# Patient Record
Sex: FEMALE | Race: WHITE | NOT HISPANIC OR LATINO | Employment: OTHER | URBAN - METROPOLITAN AREA
[De-identification: names, ages, dates, MRNs, and addresses within clinical notes are randomized per-mention and may not be internally consistent; named-entity substitution may affect disease eponyms.]

---

## 2020-01-07 ENCOUNTER — HOSPITAL ENCOUNTER (OUTPATIENT)
Dept: ONCOLOGY | Facility: HOSPITAL | Age: 75
Discharge: HOME | End: 2020-01-07
Attending: INTERNAL MEDICINE

## 2020-01-07 ENCOUNTER — OFFICE VISIT CONVERTED (OUTPATIENT)
Dept: ONCOLOGY | Facility: HOSPITAL | Age: 75
End: 2020-01-07
Attending: INTERNAL MEDICINE

## 2020-01-09 ENCOUNTER — HOSPITAL ENCOUNTER (OUTPATIENT)
Dept: PERIOP | Facility: HOSPITAL | Age: 75
Setting detail: HOSPITAL OUTPATIENT SURGERY
Discharge: HOME | End: 2020-01-09
Attending: SURGERY

## 2020-01-10 ENCOUNTER — HOSPITAL ENCOUNTER (OUTPATIENT)
Dept: MRI IMAGING | Facility: HOSPITAL | Age: 75
Discharge: HOME | End: 2020-01-10
Attending: INTERNAL MEDICINE

## 2020-01-13 ENCOUNTER — HOSPITAL ENCOUNTER (OUTPATIENT)
Dept: ONCOLOGY | Facility: HOSPITAL | Age: 75
Setting detail: RECURRING SERIES
Discharge: HOME | End: 2020-01-31
Attending: INTERNAL MEDICINE

## 2020-01-13 ENCOUNTER — HOSPITAL ENCOUNTER (OUTPATIENT)
Dept: PET IMAGING | Facility: HOSPITAL | Age: 75
Discharge: HOME | End: 2020-01-13
Attending: INTERNAL MEDICINE

## 2020-01-13 ENCOUNTER — OFFICE VISIT CONVERTED (OUTPATIENT)
Dept: ONCOLOGY | Facility: HOSPITAL | Age: 75
End: 2020-01-13
Attending: INTERNAL MEDICINE

## 2020-01-13 LAB
ALBUMIN SERPL-MCNC: 3.5 G/DL (ref 3.5–5)
ALBUMIN/GLOB SERPL: 1.2 {RATIO} (ref 1.4–2.6)
ALP SERPL-CCNC: 71 U/L (ref 43–160)
ALT SERPL-CCNC: 7 U/L (ref 10–40)
ANION GAP SERPL CALC-SCNC: 8 MMOL/L (ref 8–19)
AST SERPL-CCNC: 12 U/L (ref 15–50)
BASOPHILS # BLD AUTO: 0.02 10*3/UL (ref 0–0.2)
BASOPHILS NFR BLD AUTO: 0.1 % (ref 0–3)
BILIRUB SERPL-MCNC: 0.45 MG/DL (ref 0.2–1.3)
BUN SERPL-MCNC: 17 MG/DL (ref 5–25)
BUN/CREAT SERPL: 18 {RATIO} (ref 6–20)
CALCIUM SERPL-MCNC: 9.8 MG/DL (ref 8.7–10.4)
CALCIUM SPEC-SCNC: 9.4 MG/DL (ref 8.7–10.4)
CHLORIDE SERPL-SCNC: 98 MMOL/L (ref 99–111)
CONV ABS IMM GRAN: 0.27 10*3/UL (ref 0–0.54)
CONV CO2: 28 MMOL/L (ref 22–32)
CONV EOSINOPHILS PERCENT BY MANUAL COUNT: 0.1 % (ref 0–7)
CONV IMMATURE GRAN: 1.8 % (ref 0–0.4)
CONV TOTAL PROTEIN: 6.5 G/DL (ref 6.3–8.2)
CREAT UR-MCNC: 0.93 MG/DL (ref 0.5–0.9)
EOSINOPHIL # BLD MANUAL: 0.01 10*3/UL (ref 0–0.7)
ERYTHROCYTE [DISTWIDTH] IN BLOOD BY AUTOMATED COUNT: 13.5 % (ref 11.5–14.5)
ERYTHROCYTE [DISTWIDTH] IN BLOOD BY AUTOMATED COUNT: 43.7 FL
GFR SERPLBLD BASED ON 1.73 SQ M-ARVRAT: >60 ML/MIN/{1.73_M2}
GLOBULIN UR ELPH-MCNC: 3 G/DL (ref 2–3.5)
GLUCOSE SERPL-MCNC: 143 MG/DL (ref 65–99)
HBA1C MFR BLD: 11 G/DL (ref 12–16)
HCT VFR BLD AUTO: 33.5 % (ref 37–47)
LYMPHOCYTES # BLD AUTO: 1.53 10*3/UL (ref 1–5)
LYMPHOCYTES NFR BLD AUTO: 10.5 % (ref 20–45)
MCH RBC QN AUTO: 28.9 PG (ref 27–31)
MCHC RBC AUTO-ENTMCNC: 32.8 G/DL (ref 33–37)
MCV RBC AUTO: 87.9 FL (ref 81–99)
MONOCYTES # BLD AUTO: 0.48 10*3/UL (ref 0.2–1.2)
MONOCYTES NFR BLD MANUAL: 3.3 % (ref 3–10)
NEUTROPHILS # BLD AUTO: 12.3 10*3/UL (ref 2–8)
NEUTROPHILS NFR BLD MANUAL: 84.2 % (ref 30–85)
OSMOLALITY SERPL CALC.SUM OF ELEC: 274 MOSM/KG (ref 273–304)
PLATELET # BLD AUTO: 472 10*3/UL (ref 130–400)
PMV BLD AUTO: 8.8 FL (ref 7.4–10.4)
POTASSIUM SERPL-SCNC: 4.3 MMOL/L (ref 3.5–5.3)
RBC MORPH BLD: 3.81 10*6/UL (ref 4.2–5.4)
SODIUM SERPL-SCNC: 130 MMOL/L (ref 135–147)
T4 FREE SERPL-MCNC: 1.1 NG/DL (ref 0.9–1.8)
TSH SERPL-ACNC: 2.84 M[IU]/L (ref 0.27–4.2)
WBC # BLD AUTO: 14.61 10*3/UL (ref 4.8–10.8)

## 2020-01-27 ENCOUNTER — OFFICE VISIT CONVERTED (OUTPATIENT)
Dept: SURGERY | Facility: CLINIC | Age: 75
End: 2020-01-27
Attending: SURGERY

## 2020-02-04 ENCOUNTER — HOSPITAL ENCOUNTER (OUTPATIENT)
Dept: OTHER | Facility: HOSPITAL | Age: 75
Setting detail: RECURRING SERIES
Discharge: HOME | End: 2020-05-04
Attending: INTERNAL MEDICINE

## 2020-02-04 ENCOUNTER — OFFICE VISIT CONVERTED (OUTPATIENT)
Dept: ONCOLOGY | Facility: HOSPITAL | Age: 75
End: 2020-02-04
Attending: INTERNAL MEDICINE

## 2020-02-04 LAB
ALBUMIN SERPL-MCNC: 3 G/DL (ref 3.5–5)
ALBUMIN/GLOB SERPL: 1.1 {RATIO} (ref 1.4–2.6)
ALP SERPL-CCNC: 108 U/L (ref 43–160)
ALT SERPL-CCNC: 7 U/L (ref 10–40)
ANION GAP SERPL CALC-SCNC: 17 MMOL/L (ref 8–19)
AST SERPL-CCNC: 10 U/L (ref 15–50)
BASOPHILS # BLD AUTO: 0.05 10*3/UL (ref 0–0.2)
BASOPHILS NFR BLD AUTO: 0.3 % (ref 0–3)
BILIRUB SERPL-MCNC: 0.24 MG/DL (ref 0.2–1.3)
BUN SERPL-MCNC: 14 MG/DL (ref 5–25)
BUN/CREAT SERPL: 11 {RATIO} (ref 6–20)
CALCIUM SERPL-MCNC: 9.5 MG/DL (ref 8.7–10.4)
CALCIUM SPEC-SCNC: 8.7 MG/DL (ref 8.7–10.4)
CHLORIDE SERPL-SCNC: 105 MMOL/L (ref 99–111)
CONV ABS IMM GRAN: 1.46 10*3/UL (ref 0–0.54)
CONV CO2: 21 MMOL/L (ref 22–32)
CONV EOSINOPHILS PERCENT BY MANUAL COUNT: 0.1 % (ref 0–7)
CONV IMMATURE GRAN: 8 % (ref 0–0.4)
CONV TOTAL PROTEIN: 5.8 G/DL (ref 6.3–8.2)
CREAT UR-MCNC: 1.33 MG/DL (ref 0.5–0.9)
EOSINOPHIL # BLD MANUAL: 0.02 10*3/UL (ref 0–0.7)
ERYTHROCYTE [DISTWIDTH] IN BLOOD BY AUTOMATED COUNT: 15.3 % (ref 11.5–14.5)
ERYTHROCYTE [DISTWIDTH] IN BLOOD BY AUTOMATED COUNT: 48.7 FL
GFR SERPLBLD BASED ON 1.73 SQ M-ARVRAT: 39 ML/MIN/{1.73_M2}
GLOBULIN UR ELPH-MCNC: 2.8 G/DL (ref 2–3.5)
GLUCOSE SERPL-MCNC: 109 MG/DL (ref 65–99)
HBA1C MFR BLD: 9 G/DL (ref 12–16)
HCT VFR BLD AUTO: 27.9 % (ref 37–47)
LYMPHOCYTES # BLD AUTO: 3.46 10*3/UL (ref 1–5)
LYMPHOCYTES NFR BLD AUTO: 18.9 % (ref 20–45)
MCH RBC QN AUTO: 28.9 PG (ref 27–31)
MCHC RBC AUTO-ENTMCNC: 32.3 G/DL (ref 33–37)
MCV RBC AUTO: 89.7 FL (ref 81–99)
MONOCYTES # BLD AUTO: 1.58 10*3/UL (ref 0.2–1.2)
MONOCYTES NFR BLD MANUAL: 8.6 % (ref 3–10)
NEUTROPHILS # BLD AUTO: 11.77 10*3/UL (ref 2–8)
NEUTROPHILS NFR BLD MANUAL: 64.1 % (ref 30–85)
OSMOLALITY SERPL CALC.SUM OF ELEC: 289 MOSM/KG (ref 273–304)
PLATELET # BLD AUTO: 691 10*3/UL (ref 130–400)
PMV BLD AUTO: 8.5 FL (ref 7.4–10.4)
POTASSIUM SERPL-SCNC: 3.7 MMOL/L (ref 3.5–5.3)
RBC MORPH BLD: 3.11 10*6/UL (ref 4.2–5.4)
SODIUM SERPL-SCNC: 139 MMOL/L (ref 135–147)
T4 FREE SERPL-MCNC: 1.1 NG/DL (ref 0.9–1.8)
TSH SERPL-ACNC: 5.13 M[IU]/L (ref 0.27–4.2)
WBC # BLD AUTO: 18.34 10*3/UL (ref 4.8–10.8)

## 2020-02-06 LAB
ANION GAP SERPL CALC-SCNC: 10 MMOL/L (ref 8–19)
BUN SERPL-MCNC: 15 MG/DL (ref 5–25)
BUN/CREAT SERPL: 17 {RATIO} (ref 6–20)
CALCIUM SERPL-MCNC: 8.7 MG/DL (ref 8.7–10.4)
CALCIUM SPEC-SCNC: 8.7 MG/DL (ref 8.7–10.4)
CHLORIDE SERPL-SCNC: 110 MMOL/L (ref 99–111)
CONV CO2: 22 MMOL/L (ref 22–32)
CREAT UR-MCNC: 0.87 MG/DL (ref 0.5–0.9)
GFR SERPLBLD BASED ON 1.73 SQ M-ARVRAT: >60 ML/MIN/{1.73_M2}
GLUCOSE SERPL-MCNC: 106 MG/DL (ref 65–99)
OSMOLALITY SERPL CALC.SUM OF ELEC: 287 MOSM/KG (ref 273–304)
POTASSIUM SERPL-SCNC: 3.8 MMOL/L (ref 3.5–5.3)
SODIUM SERPL-SCNC: 138 MMOL/L (ref 135–147)

## 2020-02-25 ENCOUNTER — OFFICE VISIT CONVERTED (OUTPATIENT)
Dept: ONCOLOGY | Facility: HOSPITAL | Age: 75
End: 2020-02-25
Attending: INTERNAL MEDICINE

## 2020-02-25 LAB
ALBUMIN SERPL-MCNC: 2.9 G/DL (ref 3.5–5)
ALBUMIN/GLOB SERPL: 1 {RATIO} (ref 1.4–2.6)
ALP SERPL-CCNC: 140 U/L (ref 43–160)
ALT SERPL-CCNC: 7 U/L (ref 10–40)
ANION GAP SERPL CALC-SCNC: 13 MMOL/L (ref 8–19)
AST SERPL-CCNC: 12 U/L (ref 15–50)
BASOPHILS # BLD AUTO: 0.1 10*3/UL (ref 0–0.2)
BASOPHILS NFR BLD AUTO: 0.5 % (ref 0–3)
BILIRUB SERPL-MCNC: 0.28 MG/DL (ref 0.2–1.3)
BUN SERPL-MCNC: 14 MG/DL (ref 5–25)
BUN/CREAT SERPL: 13 {RATIO} (ref 6–20)
CALCIUM SERPL-MCNC: 8.2 MG/DL (ref 8.7–10.4)
CALCIUM SPEC-SCNC: 7.3 MG/DL (ref 8.7–10.4)
CHLORIDE SERPL-SCNC: 105 MMOL/L (ref 99–111)
CONV ABS IMM GRAN: 0.52 10*3/UL (ref 0–0.54)
CONV CO2: 23 MMOL/L (ref 22–32)
CONV EOSINOPHILS PERCENT BY MANUAL COUNT: 0.1 % (ref 0–7)
CONV IMMATURE GRAN: 2.8 % (ref 0–0.4)
CONV TOTAL PROTEIN: 5.7 G/DL (ref 6.3–8.2)
CREAT UR-MCNC: 1.08 MG/DL (ref 0.5–0.9)
EOSINOPHIL # BLD MANUAL: 0.01 10*3/UL (ref 0–0.7)
ERYTHROCYTE [DISTWIDTH] IN BLOOD BY AUTOMATED COUNT: 17 % (ref 11.5–14.5)
ERYTHROCYTE [DISTWIDTH] IN BLOOD BY AUTOMATED COUNT: 54.6 FL
FERRITIN SERPL-MCNC: 301 NG/ML (ref 10–200)
GFR SERPLBLD BASED ON 1.73 SQ M-ARVRAT: 51 ML/MIN/{1.73_M2}
GLOBULIN UR ELPH-MCNC: 2.8 G/DL (ref 2–3.5)
GLUCOSE SERPL-MCNC: 96 MG/DL (ref 65–99)
HBA1C MFR BLD: 9 G/DL (ref 12–16)
HCT VFR BLD AUTO: 27.8 % (ref 37–47)
IRON SATN MFR SERPL: 15 % (ref 20–55)
IRON SERPL-MCNC: 26 UG/DL (ref 60–170)
LYMPHOCYTES # BLD AUTO: 4.6 10*3/UL (ref 1–5)
LYMPHOCYTES NFR BLD AUTO: 24.7 % (ref 20–45)
MCH RBC QN AUTO: 28.8 PG (ref 27–31)
MCHC RBC AUTO-ENTMCNC: 32.4 G/DL (ref 33–37)
MCV RBC AUTO: 89.1 FL (ref 81–99)
MONOCYTES # BLD AUTO: 2.31 10*3/UL (ref 0.2–1.2)
MONOCYTES NFR BLD MANUAL: 12.4 % (ref 3–10)
NEUTROPHILS # BLD AUTO: 11.09 10*3/UL (ref 2–8)
NEUTROPHILS NFR BLD MANUAL: 59.5 % (ref 30–85)
OSMOLALITY SERPL CALC.SUM OF ELEC: 284 MOSM/KG (ref 273–304)
PLATELET # BLD AUTO: 759 10*3/UL (ref 130–400)
PMV BLD AUTO: 9.1 FL (ref 7.4–10.4)
POTASSIUM SERPL-SCNC: 3.5 MMOL/L (ref 3.5–5.3)
RBC MORPH BLD: 3.12 10*6/UL (ref 4.2–5.4)
SODIUM SERPL-SCNC: 137 MMOL/L (ref 135–147)
T4 FREE SERPL-MCNC: 1.2 NG/DL (ref 0.9–1.8)
TIBC SERPL-MCNC: 173 UG/DL (ref 245–450)
TRANSFERRIN SERPL-MCNC: 121 MG/DL (ref 250–380)
TSH SERPL-ACNC: 5.25 M[IU]/L (ref 0.27–4.2)
WBC # BLD AUTO: 18.63 10*3/UL (ref 4.8–10.8)

## 2020-03-17 ENCOUNTER — OFFICE VISIT CONVERTED (OUTPATIENT)
Dept: ONCOLOGY | Facility: HOSPITAL | Age: 75
End: 2020-03-17
Attending: INTERNAL MEDICINE

## 2020-03-17 LAB
ALBUMIN SERPL-MCNC: 3.5 G/DL (ref 3.5–5)
ALBUMIN/GLOB SERPL: 1.2 {RATIO} (ref 1.4–2.6)
ALP SERPL-CCNC: 129 U/L (ref 43–160)
ALT SERPL-CCNC: 8 U/L (ref 10–40)
ANION GAP SERPL CALC-SCNC: 14 MMOL/L (ref 8–19)
AST SERPL-CCNC: 14 U/L (ref 15–50)
BASOPHILS # BLD AUTO: 0.06 10*3/UL (ref 0–0.2)
BASOPHILS NFR BLD AUTO: 0.4 % (ref 0–3)
BILIRUB SERPL-MCNC: <0.15 MG/DL (ref 0.2–1.3)
BUN SERPL-MCNC: 9 MG/DL (ref 5–25)
BUN/CREAT SERPL: 9 {RATIO} (ref 6–20)
CALCIUM SERPL-MCNC: 9.5 MG/DL (ref 8.7–10.4)
CALCIUM SPEC-SCNC: 9.1 MG/DL (ref 8.7–10.4)
CHLORIDE SERPL-SCNC: 103 MMOL/L (ref 99–111)
CONV ABS IMM GRAN: 0.5 10*3/UL (ref 0–0.54)
CONV CO2: 24 MMOL/L (ref 22–32)
CONV EOSINOPHILS PERCENT BY MANUAL COUNT: 0.3 % (ref 0–7)
CONV IMMATURE GRAN: 3.1 % (ref 0–0.4)
CONV TOTAL PROTEIN: 6.5 G/DL (ref 6.3–8.2)
CREAT UR-MCNC: 0.98 MG/DL (ref 0.5–0.9)
EOSINOPHIL # BLD MANUAL: 0.05 10*3/UL (ref 0–0.7)
ERYTHROCYTE [DISTWIDTH] IN BLOOD BY AUTOMATED COUNT: 20.4 % (ref 11.5–14.5)
ERYTHROCYTE [DISTWIDTH] IN BLOOD BY AUTOMATED COUNT: 67.5 FL
GFR SERPLBLD BASED ON 1.73 SQ M-ARVRAT: 57 ML/MIN/{1.73_M2}
GLOBULIN UR ELPH-MCNC: 3 G/DL (ref 2–3.5)
GLUCOSE SERPL-MCNC: 105 MG/DL (ref 65–99)
HBA1C MFR BLD: 8.4 G/DL (ref 12–16)
HCT VFR BLD AUTO: 26.4 % (ref 37–47)
LYMPHOCYTES # BLD AUTO: 4.46 10*3/UL (ref 1–5)
LYMPHOCYTES NFR BLD AUTO: 28.1 % (ref 20–45)
MCH RBC QN AUTO: 29.8 PG (ref 27–31)
MCHC RBC AUTO-ENTMCNC: 31.8 G/DL (ref 33–37)
MCV RBC AUTO: 93.6 FL (ref 81–99)
MONOCYTES # BLD AUTO: 1.81 10*3/UL (ref 0.2–1.2)
MONOCYTES NFR BLD MANUAL: 11.4 % (ref 3–10)
NEUTROPHILS # BLD AUTO: 9 10*3/UL (ref 2–8)
NEUTROPHILS NFR BLD MANUAL: 56.7 % (ref 30–85)
OSMOLALITY SERPL CALC.SUM OF ELEC: 283 MOSM/KG (ref 273–304)
PLATELET # BLD AUTO: 768 10*3/UL (ref 130–400)
PMV BLD AUTO: 8.7 FL (ref 7.4–10.4)
POTASSIUM SERPL-SCNC: 4.1 MMOL/L (ref 3.5–5.3)
RBC MORPH BLD: 2.82 10*6/UL (ref 4.2–5.4)
SODIUM SERPL-SCNC: 137 MMOL/L (ref 135–147)
WBC # BLD AUTO: 15.88 10*3/UL (ref 4.8–10.8)

## 2020-03-23 ENCOUNTER — HOSPITAL ENCOUNTER (OUTPATIENT)
Dept: RADIATION ONCOLOGY | Facility: HOSPITAL | Age: 75
Setting detail: RECURRING SERIES
Discharge: STILL ADMITTED OR ADMINISTRATIVE | End: 2020-05-06
Attending: RADIOLOGY

## 2020-03-24 LAB
BASOPHILS # BLD AUTO: 0.04 10*3/UL (ref 0–0.2)
BASOPHILS NFR BLD AUTO: 0.2 % (ref 0–3)
CONV ABS IMM GRAN: 0.27 10*3/UL (ref 0–0.54)
CONV EOSINOPHILS PERCENT BY MANUAL COUNT: 0 % (ref 0–7)
CONV IMMATURE GRAN: 1.3 % (ref 0–0.4)
EOSINOPHIL # BLD MANUAL: 0.01 10*3/UL (ref 0–0.7)
ERYTHROCYTE [DISTWIDTH] IN BLOOD BY AUTOMATED COUNT: 18.5 % (ref 11.5–14.5)
ERYTHROCYTE [DISTWIDTH] IN BLOOD BY AUTOMATED COUNT: 63.3 FL
HBA1C MFR BLD: 7.5 G/DL (ref 12–16)
HCT VFR BLD AUTO: 23.5 % (ref 37–47)
LYMPHOCYTES # BLD AUTO: 2.06 10*3/UL (ref 1–5)
LYMPHOCYTES NFR BLD AUTO: 9.6 % (ref 20–45)
MCH RBC QN AUTO: 29.9 PG (ref 27–31)
MCHC RBC AUTO-ENTMCNC: 31.9 G/DL (ref 33–37)
MCV RBC AUTO: 93.6 FL (ref 81–99)
MONOCYTES # BLD AUTO: 0.32 10*3/UL (ref 0.2–1.2)
MONOCYTES NFR BLD MANUAL: 1.5 % (ref 3–10)
NEUTROPHILS # BLD AUTO: 18.83 10*3/UL (ref 2–8)
NEUTROPHILS NFR BLD MANUAL: 87.4 % (ref 30–85)
PATHOLOGY REVIEW: NORMAL
PLATELET # BLD AUTO: 362 10*3/UL (ref 130–400)
PMV BLD AUTO: 9 FL (ref 7.4–10.4)
RBC MORPH BLD: 2.51 10*6/UL (ref 4.2–5.4)
WBC # BLD AUTO: 21.53 10*3/UL (ref 4.8–10.8)

## 2020-03-31 LAB
BASOPHILS # BLD AUTO: 0.04 10*3/UL (ref 0–0.2)
BASOPHILS NFR BLD AUTO: 0.4 % (ref 0–3)
CONV ABS IMM GRAN: 0.3 10*3/UL (ref 0–0.54)
CONV EOSINOPHILS PERCENT BY MANUAL COUNT: 1.3 % (ref 0–7)
CONV IMMATURE GRAN: 2.6 % (ref 0–0.4)
EOSINOPHIL # BLD MANUAL: 0.15 10*3/UL (ref 0–0.7)
ERYTHROCYTE [DISTWIDTH] IN BLOOD BY AUTOMATED COUNT: 19.4 % (ref 11.5–14.5)
ERYTHROCYTE [DISTWIDTH] IN BLOOD BY AUTOMATED COUNT: 65.6 FL
HBA1C MFR BLD: 8.5 G/DL (ref 12–16)
HCT VFR BLD AUTO: 26.9 % (ref 37–47)
LYMPHOCYTES # BLD AUTO: 3.84 10*3/UL (ref 1–5)
LYMPHOCYTES NFR BLD AUTO: 33.8 % (ref 20–45)
MCH RBC QN AUTO: 30 PG (ref 27–31)
MCHC RBC AUTO-ENTMCNC: 31.6 G/DL (ref 33–37)
MCV RBC AUTO: 95.1 FL (ref 81–99)
MONOCYTES # BLD AUTO: 1.33 10*3/UL (ref 0.2–1.2)
MONOCYTES NFR BLD MANUAL: 11.7 % (ref 3–10)
NEUTROPHILS # BLD AUTO: 5.69 10*3/UL (ref 2–8)
NEUTROPHILS NFR BLD MANUAL: 50.2 % (ref 30–85)
PLATELET # BLD AUTO: 114 10*3/UL (ref 130–400)
PMV BLD AUTO: 9.9 FL (ref 7.4–10.4)
RBC MORPH BLD: 2.83 10*6/UL (ref 4.2–5.4)
WBC # BLD AUTO: 11.35 10*3/UL (ref 4.8–10.8)

## 2020-04-07 ENCOUNTER — OFFICE VISIT CONVERTED (OUTPATIENT)
Dept: ONCOLOGY | Facility: HOSPITAL | Age: 75
End: 2020-04-07
Attending: INTERNAL MEDICINE

## 2020-04-07 LAB
ALBUMIN SERPL-MCNC: 3.8 G/DL (ref 3.5–5)
ALBUMIN/GLOB SERPL: 1.4 {RATIO} (ref 1.4–2.6)
ALP SERPL-CCNC: 108 U/L (ref 43–160)
ALT SERPL-CCNC: 9 U/L (ref 10–40)
ANION GAP SERPL CALC-SCNC: 13 MMOL/L (ref 8–19)
AST SERPL-CCNC: 13 U/L (ref 15–50)
BASOPHILS # BLD AUTO: 0.03 10*3/UL (ref 0–0.2)
BASOPHILS NFR BLD AUTO: 0.3 % (ref 0–3)
BILIRUB SERPL-MCNC: 0.18 MG/DL (ref 0.2–1.3)
BUN SERPL-MCNC: 20 MG/DL (ref 5–25)
BUN/CREAT SERPL: 19 {RATIO} (ref 6–20)
CALCIUM SERPL-MCNC: 9.6 MG/DL (ref 8.7–10.4)
CALCIUM SPEC-SCNC: 9.4 MG/DL (ref 8.7–10.4)
CHLORIDE SERPL-SCNC: 103 MMOL/L (ref 99–111)
CONV ABS IMM GRAN: 0.11 10*3/UL (ref 0–0.54)
CONV CO2: 23 MMOL/L (ref 22–32)
CONV EOSINOPHILS PERCENT BY MANUAL COUNT: 0.7 % (ref 0–7)
CONV IMMATURE GRAN: 1.1 % (ref 0–0.4)
CONV TOTAL PROTEIN: 6.5 G/DL (ref 6.3–8.2)
CREAT UR-MCNC: 1.07 MG/DL (ref 0.5–0.9)
EOSINOPHIL # BLD MANUAL: 0.07 10*3/UL (ref 0–0.7)
ERYTHROCYTE [DISTWIDTH] IN BLOOD BY AUTOMATED COUNT: 18.7 % (ref 11.5–14.5)
ERYTHROCYTE [DISTWIDTH] IN BLOOD BY AUTOMATED COUNT: 64.8 FL
GFR SERPLBLD BASED ON 1.73 SQ M-ARVRAT: 51 ML/MIN/{1.73_M2}
GLOBULIN UR ELPH-MCNC: 2.7 G/DL (ref 2–3.5)
GLUCOSE SERPL-MCNC: 108 MG/DL (ref 65–99)
HBA1C MFR BLD: 9 G/DL (ref 12–16)
HCT VFR BLD AUTO: 28.2 % (ref 37–47)
LYMPHOCYTES # BLD AUTO: 1.84 10*3/UL (ref 1–5)
LYMPHOCYTES NFR BLD AUTO: 18.4 % (ref 20–45)
MCH RBC QN AUTO: 30.2 PG (ref 27–31)
MCHC RBC AUTO-ENTMCNC: 31.9 G/DL (ref 33–37)
MCV RBC AUTO: 94.6 FL (ref 81–99)
MONOCYTES # BLD AUTO: 0.99 10*3/UL (ref 0.2–1.2)
MONOCYTES NFR BLD MANUAL: 9.9 % (ref 3–10)
NEUTROPHILS # BLD AUTO: 6.98 10*3/UL (ref 2–8)
NEUTROPHILS NFR BLD MANUAL: 69.6 % (ref 30–85)
OSMOLALITY SERPL CALC.SUM OF ELEC: 283 MOSM/KG (ref 273–304)
PLATELET # BLD AUTO: 516 10*3/UL (ref 130–400)
PMV BLD AUTO: 8.3 FL (ref 7.4–10.4)
POTASSIUM SERPL-SCNC: 3.7 MMOL/L (ref 3.5–5.3)
RBC MORPH BLD: 2.98 10*6/UL (ref 4.2–5.4)
SODIUM SERPL-SCNC: 135 MMOL/L (ref 135–147)
T4 FREE SERPL-MCNC: 1 NG/DL (ref 0.9–1.8)
TSH SERPL-ACNC: 4.67 M[IU]/L (ref 0.27–4.2)
WBC # BLD AUTO: 10.02 10*3/UL (ref 4.8–10.8)

## 2020-04-28 ENCOUNTER — OFFICE VISIT CONVERTED (OUTPATIENT)
Dept: ONCOLOGY | Facility: HOSPITAL | Age: 75
End: 2020-04-28
Attending: INTERNAL MEDICINE

## 2020-04-28 LAB
ALBUMIN SERPL-MCNC: 3.9 G/DL (ref 3.5–5)
ALBUMIN/GLOB SERPL: 1.5 {RATIO} (ref 1.4–2.6)
ALP SERPL-CCNC: 80 U/L (ref 43–160)
ALT SERPL-CCNC: 7 U/L (ref 10–40)
ANION GAP SERPL CALC-SCNC: 14 MMOL/L (ref 8–19)
AST SERPL-CCNC: 13 U/L (ref 15–50)
BASOPHILS # BLD AUTO: 0.04 10*3/UL (ref 0–0.2)
BASOPHILS NFR BLD AUTO: 0.5 % (ref 0–3)
BILIRUB SERPL-MCNC: 0.19 MG/DL (ref 0.2–1.3)
BUN SERPL-MCNC: 22 MG/DL (ref 5–25)
BUN/CREAT SERPL: 20 {RATIO} (ref 6–20)
CALCIUM SERPL-MCNC: 9.6 MG/DL (ref 8.7–10.4)
CALCIUM SPEC-SCNC: 9.5 MG/DL (ref 8.7–10.4)
CHLORIDE SERPL-SCNC: 101 MMOL/L (ref 99–111)
CONV ABS IMM GRAN: 0 10*3/UL (ref 0–0.54)
CONV CO2: 24 MMOL/L (ref 22–32)
CONV EOSINOPHILS PERCENT BY MANUAL COUNT: 3.1 % (ref 0–7)
CONV IMMATURE GRAN: 0 % (ref 0–0.4)
CONV TOTAL PROTEIN: 6.5 G/DL (ref 6.3–8.2)
CREAT UR-MCNC: 1.1 MG/DL (ref 0.5–0.9)
EOSINOPHIL # BLD MANUAL: 0.23 10*3/UL (ref 0–0.7)
ERYTHROCYTE [DISTWIDTH] IN BLOOD BY AUTOMATED COUNT: 14.6 % (ref 11.5–14.5)
ERYTHROCYTE [DISTWIDTH] IN BLOOD BY AUTOMATED COUNT: 51.2 FL
GFR SERPLBLD BASED ON 1.73 SQ M-ARVRAT: 49 ML/MIN/{1.73_M2}
GLOBULIN UR ELPH-MCNC: 2.6 G/DL (ref 2–3.5)
GLUCOSE SERPL-MCNC: 104 MG/DL (ref 65–99)
HBA1C MFR BLD: 10.1 G/DL (ref 12–16)
HCT VFR BLD AUTO: 31.9 % (ref 37–47)
LYMPHOCYTES # BLD AUTO: 0.81 10*3/UL (ref 1–5)
LYMPHOCYTES NFR BLD AUTO: 10.9 % (ref 20–45)
MCH RBC QN AUTO: 30.3 PG (ref 27–31)
MCHC RBC AUTO-ENTMCNC: 31.7 G/DL (ref 33–37)
MCV RBC AUTO: 95.8 FL (ref 81–99)
MONOCYTES # BLD AUTO: 0.73 10*3/UL (ref 0.2–1.2)
MONOCYTES NFR BLD MANUAL: 9.8 % (ref 3–10)
NEUTROPHILS # BLD AUTO: 5.65 10*3/UL (ref 2–8)
NEUTROPHILS NFR BLD MANUAL: 75.7 % (ref 30–85)
OSMOLALITY SERPL CALC.SUM OF ELEC: 284 MOSM/KG (ref 273–304)
PLATELET # BLD AUTO: 280 10*3/UL (ref 130–400)
PMV BLD AUTO: 8.8 FL (ref 7.4–10.4)
POTASSIUM SERPL-SCNC: 3.7 MMOL/L (ref 3.5–5.3)
RBC MORPH BLD: 3.33 10*6/UL (ref 4.2–5.4)
SODIUM SERPL-SCNC: 135 MMOL/L (ref 135–147)
WBC # BLD AUTO: 7.46 10*3/UL (ref 4.8–10.8)

## 2020-05-19 ENCOUNTER — OFFICE VISIT CONVERTED (OUTPATIENT)
Dept: ONCOLOGY | Facility: HOSPITAL | Age: 75
End: 2020-05-19
Attending: INTERNAL MEDICINE

## 2020-05-19 ENCOUNTER — HOSPITAL ENCOUNTER (OUTPATIENT)
Dept: OTHER | Facility: HOSPITAL | Age: 75
Setting detail: RECURRING SERIES
Discharge: HOME | End: 2020-08-17
Attending: INTERNAL MEDICINE

## 2020-05-19 LAB
ALBUMIN SERPL-MCNC: 3.8 G/DL (ref 3.5–5)
ALBUMIN/GLOB SERPL: 1.3 {RATIO} (ref 1.4–2.6)
ALP SERPL-CCNC: 83 U/L (ref 43–160)
ALT SERPL-CCNC: 7 U/L (ref 10–40)
ANION GAP SERPL CALC-SCNC: 15 MMOL/L (ref 8–19)
AST SERPL-CCNC: 12 U/L (ref 15–50)
BASOPHILS # BLD AUTO: 0.03 10*3/UL (ref 0–0.2)
BASOPHILS NFR BLD AUTO: 0.5 % (ref 0–3)
BILIRUB SERPL-MCNC: 0.25 MG/DL (ref 0.2–1.3)
BUN SERPL-MCNC: 20 MG/DL (ref 5–25)
BUN/CREAT SERPL: 20 {RATIO} (ref 6–20)
CALCIUM SERPL-MCNC: 9.9 MG/DL (ref 8.7–10.4)
CALCIUM SPEC-SCNC: 9.7 MG/DL (ref 8.7–10.4)
CHLORIDE SERPL-SCNC: 99 MMOL/L (ref 99–111)
CONV ABS IMM GRAN: 0.01 10*3/UL (ref 0–0.54)
CONV CO2: 23 MMOL/L (ref 22–32)
CONV EOSINOPHILS PERCENT BY MANUAL COUNT: 2 % (ref 0–7)
CONV IMMATURE GRAN: 0.2 % (ref 0–0.4)
CONV TOTAL PROTEIN: 6.8 G/DL (ref 6.3–8.2)
CREAT UR-MCNC: 0.99 MG/DL (ref 0.5–0.9)
EOSINOPHIL # BLD MANUAL: 0.11 10*3/UL (ref 0–0.7)
ERYTHROCYTE [DISTWIDTH] IN BLOOD BY AUTOMATED COUNT: 12.8 % (ref 11.5–14.5)
ERYTHROCYTE [DISTWIDTH] IN BLOOD BY AUTOMATED COUNT: 43.1 FL
GFR SERPLBLD BASED ON 1.73 SQ M-ARVRAT: 56 ML/MIN/{1.73_M2}
GLOBULIN UR ELPH-MCNC: 3 G/DL (ref 2–3.5)
GLUCOSE SERPL-MCNC: 110 MG/DL (ref 65–99)
HBA1C MFR BLD: 11.9 G/DL (ref 12–16)
HCT VFR BLD AUTO: 36.9 % (ref 37–47)
LYMPHOCYTES # BLD AUTO: 1.04 10*3/UL (ref 1–5)
LYMPHOCYTES NFR BLD AUTO: 18.7 % (ref 20–45)
MCH RBC QN AUTO: 29.9 PG (ref 27–31)
MCHC RBC AUTO-ENTMCNC: 32.2 G/DL (ref 33–37)
MCV RBC AUTO: 92.7 FL (ref 81–99)
MONOCYTES # BLD AUTO: 0.5 10*3/UL (ref 0.2–1.2)
MONOCYTES NFR BLD MANUAL: 9 % (ref 3–10)
NEUTROPHILS # BLD AUTO: 3.87 10*3/UL (ref 2–8)
NEUTROPHILS NFR BLD MANUAL: 69.6 % (ref 30–85)
OSMOLALITY SERPL CALC.SUM OF ELEC: 279 MOSM/KG (ref 273–304)
PLATELET # BLD AUTO: 285 10*3/UL (ref 130–400)
PMV BLD AUTO: 8.8 FL (ref 7.4–10.4)
POTASSIUM SERPL-SCNC: 3.6 MMOL/L (ref 3.5–5.3)
RBC MORPH BLD: 3.98 10*6/UL (ref 4.2–5.4)
SODIUM SERPL-SCNC: 133 MMOL/L (ref 135–147)
T4 FREE SERPL-MCNC: 1.2 NG/DL (ref 0.9–1.8)
TSH SERPL-ACNC: 3.76 M[IU]/L (ref 0.27–4.2)
WBC # BLD AUTO: 5.56 10*3/UL (ref 4.8–10.8)

## 2020-06-09 ENCOUNTER — OFFICE VISIT CONVERTED (OUTPATIENT)
Dept: ONCOLOGY | Facility: HOSPITAL | Age: 75
End: 2020-06-09
Attending: INTERNAL MEDICINE

## 2020-06-09 LAB
ALBUMIN SERPL-MCNC: 3.8 G/DL (ref 3.5–5)
ALBUMIN/GLOB SERPL: 1.3 {RATIO} (ref 1.4–2.6)
ALP SERPL-CCNC: 77 U/L (ref 43–160)
ALT SERPL-CCNC: 6 U/L (ref 10–40)
ANION GAP SERPL CALC-SCNC: 14 MMOL/L (ref 8–19)
AST SERPL-CCNC: 13 U/L (ref 15–50)
BASOPHILS # BLD AUTO: 0.03 10*3/UL (ref 0–0.2)
BASOPHILS NFR BLD AUTO: 0.4 % (ref 0–3)
BILIRUB SERPL-MCNC: 0.22 MG/DL (ref 0.2–1.3)
BUN SERPL-MCNC: 15 MG/DL (ref 5–25)
BUN/CREAT SERPL: 14 {RATIO} (ref 6–20)
CALCIUM SERPL-MCNC: 10 MG/DL (ref 8.7–10.4)
CALCIUM SPEC-SCNC: 9.8 MG/DL (ref 8.7–10.4)
CHLORIDE SERPL-SCNC: 100 MMOL/L (ref 99–111)
CONV ABS IMM GRAN: 0.01 10*3/UL (ref 0–0.54)
CONV CO2: 24 MMOL/L (ref 22–32)
CONV EOSINOPHILS PERCENT BY MANUAL COUNT: 2.1 % (ref 0–7)
CONV IMMATURE GRAN: 0.1 % (ref 0–0.4)
CONV TOTAL PROTEIN: 6.7 G/DL (ref 6.3–8.2)
CREAT UR-MCNC: 1.11 MG/DL (ref 0.5–0.9)
EOSINOPHIL # BLD MANUAL: 0.14 10*3/UL (ref 0–0.7)
ERYTHROCYTE [DISTWIDTH] IN BLOOD BY AUTOMATED COUNT: 12.7 % (ref 11.5–14.5)
ERYTHROCYTE [DISTWIDTH] IN BLOOD BY AUTOMATED COUNT: 41.8 FL
GFR SERPLBLD BASED ON 1.73 SQ M-ARVRAT: 49 ML/MIN/{1.73_M2}
GLOBULIN UR ELPH-MCNC: 2.9 G/DL (ref 2–3.5)
GLUCOSE SERPL-MCNC: 102 MG/DL (ref 65–99)
HBA1C MFR BLD: 12.2 G/DL (ref 12–16)
HCT VFR BLD AUTO: 36.9 % (ref 37–47)
LYMPHOCYTES # BLD AUTO: 1.61 10*3/UL (ref 1–5)
LYMPHOCYTES NFR BLD AUTO: 23.7 % (ref 20–45)
MCH RBC QN AUTO: 29.6 PG (ref 27–31)
MCHC RBC AUTO-ENTMCNC: 33.1 G/DL (ref 33–37)
MCV RBC AUTO: 89.6 FL (ref 81–99)
MONOCYTES # BLD AUTO: 0.65 10*3/UL (ref 0.2–1.2)
MONOCYTES NFR BLD MANUAL: 9.6 % (ref 3–10)
NEUTROPHILS # BLD AUTO: 4.34 10*3/UL (ref 2–8)
NEUTROPHILS NFR BLD MANUAL: 64.1 % (ref 30–85)
OSMOLALITY SERPL CALC.SUM OF ELEC: 279 MOSM/KG (ref 273–304)
PLATELET # BLD AUTO: 302 10*3/UL (ref 130–400)
PMV BLD AUTO: 8.6 FL (ref 7.4–10.4)
POTASSIUM SERPL-SCNC: 3.7 MMOL/L (ref 3.5–5.3)
RBC MORPH BLD: 4.12 10*6/UL (ref 4.2–5.4)
SODIUM SERPL-SCNC: 134 MMOL/L (ref 135–147)
WBC # BLD AUTO: 6.78 10*3/UL (ref 4.8–10.8)

## 2020-06-22 ENCOUNTER — HOSPITAL ENCOUNTER (OUTPATIENT)
Dept: RADIATION ONCOLOGY | Facility: HOSPITAL | Age: 75
Discharge: HOME | End: 2020-06-22
Attending: RADIOLOGY

## 2020-06-25 ENCOUNTER — HOSPITAL ENCOUNTER (OUTPATIENT)
Dept: CT IMAGING | Facility: HOSPITAL | Age: 75
Discharge: HOME | End: 2020-06-25
Attending: INTERNAL MEDICINE

## 2020-06-30 ENCOUNTER — OFFICE VISIT CONVERTED (OUTPATIENT)
Dept: ONCOLOGY | Facility: HOSPITAL | Age: 75
End: 2020-06-30
Attending: INTERNAL MEDICINE

## 2020-06-30 LAB
ALBUMIN SERPL-MCNC: 3.7 G/DL (ref 3.5–5)
ALBUMIN/GLOB SERPL: 1.4 {RATIO} (ref 1.4–2.6)
ALP SERPL-CCNC: 64 U/L (ref 43–160)
ALT SERPL-CCNC: 6 U/L (ref 10–40)
ANION GAP SERPL CALC-SCNC: 13 MMOL/L (ref 8–19)
AST SERPL-CCNC: 12 U/L (ref 15–50)
BASOPHILS # BLD AUTO: 0.03 10*3/UL (ref 0–0.2)
BASOPHILS NFR BLD AUTO: 0.5 % (ref 0–3)
BILIRUB SERPL-MCNC: 0.19 MG/DL (ref 0.2–1.3)
BUN SERPL-MCNC: 12 MG/DL (ref 5–25)
BUN/CREAT SERPL: 13 {RATIO} (ref 6–20)
CALCIUM SERPL-MCNC: 9.7 MG/DL (ref 8.7–10.4)
CALCIUM SPEC-SCNC: 9.5 MG/DL (ref 8.7–10.4)
CHLORIDE SERPL-SCNC: 105 MMOL/L (ref 99–111)
CONV ABS IMM GRAN: 0.01 10*3/UL (ref 0–0.54)
CONV CO2: 23 MMOL/L (ref 22–32)
CONV EOSINOPHILS PERCENT BY MANUAL COUNT: 3.2 % (ref 0–7)
CONV IMMATURE GRAN: 0.2 % (ref 0–0.4)
CONV TOTAL PROTEIN: 6.4 G/DL (ref 6.3–8.2)
CREAT UR-MCNC: 0.96 MG/DL (ref 0.5–0.9)
EOSINOPHIL # BLD MANUAL: 0.21 10*3/UL (ref 0–0.7)
ERYTHROCYTE [DISTWIDTH] IN BLOOD BY AUTOMATED COUNT: 12.9 % (ref 11.5–14.5)
ERYTHROCYTE [DISTWIDTH] IN BLOOD BY AUTOMATED COUNT: 41.5 FL
GFR SERPLBLD BASED ON 1.73 SQ M-ARVRAT: 58 ML/MIN/{1.73_M2}
GLOBULIN UR ELPH-MCNC: 2.7 G/DL (ref 2–3.5)
GLUCOSE SERPL-MCNC: 112 MG/DL (ref 65–99)
HBA1C MFR BLD: 12 G/DL (ref 12–16)
HCT VFR BLD AUTO: 36.8 % (ref 37–47)
LYMPHOCYTES # BLD AUTO: 1.96 10*3/UL (ref 1–5)
LYMPHOCYTES NFR BLD AUTO: 29.9 % (ref 20–45)
MCH RBC QN AUTO: 28.6 PG (ref 27–31)
MCHC RBC AUTO-ENTMCNC: 32.6 G/DL (ref 33–37)
MCV RBC AUTO: 87.8 FL (ref 81–99)
MONOCYTES # BLD AUTO: 0.53 10*3/UL (ref 0.2–1.2)
MONOCYTES NFR BLD MANUAL: 8.1 % (ref 3–10)
NEUTROPHILS # BLD AUTO: 3.82 10*3/UL (ref 2–8)
NEUTROPHILS NFR BLD MANUAL: 58.1 % (ref 30–85)
OSMOLALITY SERPL CALC.SUM OF ELEC: 285 MOSM/KG (ref 273–304)
PLATELET # BLD AUTO: 254 10*3/UL (ref 130–400)
PMV BLD AUTO: 9 FL (ref 7.4–10.4)
POTASSIUM SERPL-SCNC: 3.7 MMOL/L (ref 3.5–5.3)
RBC MORPH BLD: 4.19 10*6/UL (ref 4.2–5.4)
SODIUM SERPL-SCNC: 137 MMOL/L (ref 135–147)
T4 FREE SERPL-MCNC: 1 NG/DL (ref 0.9–1.8)
TSH SERPL-ACNC: 4.69 M[IU]/L (ref 0.27–4.2)
WBC # BLD AUTO: 6.56 10*3/UL (ref 4.8–10.8)

## 2020-07-21 LAB
ALBUMIN SERPL-MCNC: 3.7 G/DL (ref 3.5–5)
ALBUMIN/GLOB SERPL: 1.4 {RATIO} (ref 1.4–2.6)
ALP SERPL-CCNC: 72 U/L (ref 43–160)
ALT SERPL-CCNC: 5 U/L (ref 10–40)
ANION GAP SERPL CALC-SCNC: 13 MMOL/L (ref 8–19)
AST SERPL-CCNC: 12 U/L (ref 15–50)
BASOPHILS # BLD AUTO: 0.03 10*3/UL (ref 0–0.2)
BASOPHILS NFR BLD AUTO: 0.4 % (ref 0–3)
BILIRUB SERPL-MCNC: 0.25 MG/DL (ref 0.2–1.3)
BUN SERPL-MCNC: 11 MG/DL (ref 5–25)
BUN/CREAT SERPL: 11 {RATIO} (ref 6–20)
CALCIUM SERPL-MCNC: 9.9 MG/DL (ref 8.7–10.4)
CHLORIDE SERPL-SCNC: 103 MMOL/L (ref 99–111)
CONV ABS IMM GRAN: 0.01 10*3/UL (ref 0–0.54)
CONV CO2: 24 MMOL/L (ref 22–32)
CONV EOSINOPHILS PERCENT BY MANUAL COUNT: 2.4 % (ref 0–7)
CONV IMMATURE GRAN: 0.1 % (ref 0–0.4)
CONV TOTAL PROTEIN: 6.4 G/DL (ref 6.3–8.2)
CREAT UR-MCNC: 1.01 MG/DL (ref 0.5–0.9)
EOSINOPHIL # BLD MANUAL: 0.17 10*3/UL (ref 0–0.7)
ERYTHROCYTE [DISTWIDTH] IN BLOOD BY AUTOMATED COUNT: 14.4 % (ref 11.5–14.5)
ERYTHROCYTE [DISTWIDTH] IN BLOOD BY AUTOMATED COUNT: 46.4 FL
GFR SERPLBLD BASED ON 1.73 SQ M-ARVRAT: 55 ML/MIN/{1.73_M2}
GLOBULIN UR ELPH-MCNC: 2.7 G/DL (ref 2–3.5)
GLUCOSE SERPL-MCNC: 98 MG/DL (ref 65–99)
HBA1C MFR BLD: 12.6 G/DL (ref 12–16)
HCT VFR BLD AUTO: 39.7 % (ref 37–47)
LYMPHOCYTES # BLD AUTO: 1.69 10*3/UL (ref 1–5)
LYMPHOCYTES NFR BLD AUTO: 23.7 % (ref 20–45)
MCH RBC QN AUTO: 28 PG (ref 27–31)
MCHC RBC AUTO-ENTMCNC: 31.7 G/DL (ref 33–37)
MCV RBC AUTO: 88.2 FL (ref 81–99)
MONOCYTES # BLD AUTO: 0.68 10*3/UL (ref 0.2–1.2)
MONOCYTES NFR BLD MANUAL: 9.5 % (ref 3–10)
NEUTROPHILS # BLD AUTO: 4.55 10*3/UL (ref 2–8)
NEUTROPHILS NFR BLD MANUAL: 63.9 % (ref 30–85)
OSMOLALITY SERPL CALC.SUM OF ELEC: 281 MOSM/KG (ref 273–304)
PLATELET # BLD AUTO: 294 10*3/UL (ref 130–400)
PMV BLD AUTO: 8.8 FL (ref 7.4–10.4)
POTASSIUM SERPL-SCNC: 3.9 MMOL/L (ref 3.5–5.3)
RBC MORPH BLD: 4.5 10*6/UL (ref 4.2–5.4)
SODIUM SERPL-SCNC: 136 MMOL/L (ref 135–147)
WBC # BLD AUTO: 7.13 10*3/UL (ref 4.8–10.8)

## 2020-08-04 ENCOUNTER — HOSPITAL ENCOUNTER (OUTPATIENT)
Dept: MRI IMAGING | Facility: HOSPITAL | Age: 75
Discharge: HOME | End: 2020-08-04
Attending: INTERNAL MEDICINE

## 2020-08-11 ENCOUNTER — OFFICE VISIT CONVERTED (OUTPATIENT)
Dept: ONCOLOGY | Facility: HOSPITAL | Age: 75
End: 2020-08-11
Attending: INTERNAL MEDICINE

## 2020-08-11 LAB
ALBUMIN SERPL-MCNC: 3.6 G/DL (ref 3.5–5)
ALBUMIN/GLOB SERPL: 1.4 {RATIO} (ref 1.4–2.6)
ALP SERPL-CCNC: 72 U/L (ref 43–160)
ALT SERPL-CCNC: 6 U/L (ref 10–40)
ANION GAP SERPL CALC-SCNC: 12 MMOL/L (ref 8–19)
AST SERPL-CCNC: 12 U/L (ref 15–50)
BASOPHILS # BLD AUTO: 0.03 10*3/UL (ref 0–0.2)
BASOPHILS NFR BLD AUTO: 0.5 % (ref 0–3)
BILIRUB SERPL-MCNC: 0.4 MG/DL (ref 0.2–1.3)
BUN SERPL-MCNC: 14 MG/DL (ref 5–25)
BUN/CREAT SERPL: 15 {RATIO} (ref 6–20)
CALCIUM SERPL-MCNC: 9.5 MG/DL (ref 8.7–10.4)
CHLORIDE SERPL-SCNC: 105 MMOL/L (ref 99–111)
CONV ABS IMM GRAN: 0.02 10*3/UL (ref 0–0.54)
CONV CO2: 24 MMOL/L (ref 22–32)
CONV EOSINOPHILS PERCENT BY MANUAL COUNT: 2.9 % (ref 0–7)
CONV IMMATURE GRAN: 0.3 % (ref 0–0.4)
CONV TOTAL PROTEIN: 6.1 G/DL (ref 6.3–8.2)
CREAT UR-MCNC: 0.96 MG/DL (ref 0.5–0.9)
EOSINOPHIL # BLD MANUAL: 0.19 10*3/UL (ref 0–0.7)
ERYTHROCYTE [DISTWIDTH] IN BLOOD BY AUTOMATED COUNT: 15.5 % (ref 11.5–14.5)
ERYTHROCYTE [DISTWIDTH] IN BLOOD BY AUTOMATED COUNT: 50.3 FL
GFR SERPLBLD BASED ON 1.73 SQ M-ARVRAT: 58 ML/MIN/{1.73_M2}
GLOBULIN UR ELPH-MCNC: 2.5 G/DL (ref 2–3.5)
GLUCOSE SERPL-MCNC: 90 MG/DL (ref 65–99)
HBA1C MFR BLD: 11.8 G/DL (ref 12–16)
HCT VFR BLD AUTO: 36.4 % (ref 37–47)
LYMPHOCYTES # BLD AUTO: 1.67 10*3/UL (ref 1–5)
LYMPHOCYTES NFR BLD AUTO: 25.8 % (ref 20–45)
MCH RBC QN AUTO: 28.3 PG (ref 27–31)
MCHC RBC AUTO-ENTMCNC: 32.4 G/DL (ref 33–37)
MCV RBC AUTO: 87.3 FL (ref 81–99)
MONOCYTES # BLD AUTO: 0.58 10*3/UL (ref 0.2–1.2)
MONOCYTES NFR BLD MANUAL: 9 % (ref 3–10)
NEUTROPHILS # BLD AUTO: 3.98 10*3/UL (ref 2–8)
NEUTROPHILS NFR BLD MANUAL: 61.5 % (ref 30–85)
OSMOLALITY SERPL CALC.SUM OF ELEC: 284 MOSM/KG (ref 273–304)
PLATELET # BLD AUTO: 293 10*3/UL (ref 130–400)
PMV BLD AUTO: 9 FL (ref 7.4–10.4)
POTASSIUM SERPL-SCNC: 3.9 MMOL/L (ref 3.5–5.3)
RBC MORPH BLD: 4.17 10*6/UL (ref 4.2–5.4)
SODIUM SERPL-SCNC: 137 MMOL/L (ref 135–147)
WBC # BLD AUTO: 6.47 10*3/UL (ref 4.8–10.8)

## 2020-09-01 ENCOUNTER — HOSPITAL ENCOUNTER (OUTPATIENT)
Dept: OTHER | Facility: HOSPITAL | Age: 75
Setting detail: RECURRING SERIES
Discharge: HOME | End: 2020-11-30
Attending: INTERNAL MEDICINE

## 2020-09-01 ENCOUNTER — OFFICE VISIT CONVERTED (OUTPATIENT)
Dept: ONCOLOGY | Facility: HOSPITAL | Age: 75
End: 2020-09-01
Attending: INTERNAL MEDICINE

## 2020-09-01 LAB
ALBUMIN SERPL-MCNC: 4 G/DL (ref 3.5–5)
ALBUMIN/GLOB SERPL: 1.5 {RATIO} (ref 1.4–2.6)
ALP SERPL-CCNC: 81 U/L (ref 43–160)
ALT SERPL-CCNC: 5 U/L (ref 10–40)
ANION GAP SERPL CALC-SCNC: 14 MMOL/L (ref 8–19)
AST SERPL-CCNC: 11 U/L (ref 15–50)
BASOPHILS # BLD AUTO: 0.02 10*3/UL (ref 0–0.2)
BASOPHILS NFR BLD AUTO: 0.3 % (ref 0–3)
BILIRUB SERPL-MCNC: 0.39 MG/DL (ref 0.2–1.3)
BUN SERPL-MCNC: 12 MG/DL (ref 5–25)
BUN/CREAT SERPL: 13 {RATIO} (ref 6–20)
CALCIUM SERPL-MCNC: 9.7 MG/DL (ref 8.7–10.4)
CHLORIDE SERPL-SCNC: 104 MMOL/L (ref 99–111)
CONV ABS IMM GRAN: 0.02 10*3/UL (ref 0–0.54)
CONV CO2: 23 MMOL/L (ref 22–32)
CONV EOSINOPHILS PERCENT BY MANUAL COUNT: 2.3 % (ref 0–7)
CONV IMMATURE GRAN: 0.3 % (ref 0–0.4)
CONV TOTAL PROTEIN: 6.7 G/DL (ref 6.3–8.2)
CREAT UR-MCNC: 0.93 MG/DL (ref 0.5–0.9)
EOSINOPHIL # BLD MANUAL: 0.17 10*3/UL (ref 0–0.7)
ERYTHROCYTE [DISTWIDTH] IN BLOOD BY AUTOMATED COUNT: 16.3 % (ref 11.5–14.5)
ERYTHROCYTE [DISTWIDTH] IN BLOOD BY AUTOMATED COUNT: 54 FL
GFR SERPLBLD BASED ON 1.73 SQ M-ARVRAT: >60 ML/MIN/{1.73_M2}
GLOBULIN UR ELPH-MCNC: 2.7 G/DL (ref 2–3.5)
GLUCOSE SERPL-MCNC: 101 MG/DL (ref 65–99)
HBA1C MFR BLD: 12.4 G/DL (ref 12–16)
HCT VFR BLD AUTO: 38 % (ref 37–47)
LYMPHOCYTES # BLD AUTO: 2.21 10*3/UL (ref 1–5)
LYMPHOCYTES NFR BLD AUTO: 30.1 % (ref 20–45)
MCH RBC QN AUTO: 29.2 PG (ref 27–31)
MCHC RBC AUTO-ENTMCNC: 32.6 G/DL (ref 33–37)
MCV RBC AUTO: 89.6 FL (ref 81–99)
MONOCYTES # BLD AUTO: 0.66 10*3/UL (ref 0.2–1.2)
MONOCYTES NFR BLD MANUAL: 9 % (ref 3–10)
NEUTROPHILS # BLD AUTO: 4.27 10*3/UL (ref 2–8)
NEUTROPHILS NFR BLD MANUAL: 58 % (ref 30–85)
OSMOLALITY SERPL CALC.SUM OF ELEC: 284 MOSM/KG (ref 273–304)
PLATELET # BLD AUTO: 344 10*3/UL (ref 130–400)
PMV BLD AUTO: 8.8 FL (ref 7.4–10.4)
POTASSIUM SERPL-SCNC: 3.7 MMOL/L (ref 3.5–5.3)
RBC MORPH BLD: 4.24 10*6/UL (ref 4.2–5.4)
SODIUM SERPL-SCNC: 137 MMOL/L (ref 135–147)
T4 FREE SERPL-MCNC: 1.1 NG/DL (ref 0.9–1.8)
TSH SERPL-ACNC: 2.47 M[IU]/L (ref 0.27–4.2)
WBC # BLD AUTO: 7.35 10*3/UL (ref 4.8–10.8)

## 2020-09-22 ENCOUNTER — OFFICE VISIT CONVERTED (OUTPATIENT)
Dept: ONCOLOGY | Facility: HOSPITAL | Age: 75
End: 2020-09-22
Attending: INTERNAL MEDICINE

## 2020-09-22 LAB
ALBUMIN SERPL-MCNC: 3.8 G/DL (ref 3.5–5)
ALBUMIN/GLOB SERPL: 1.5 {RATIO} (ref 1.4–2.6)
ALP SERPL-CCNC: 84 U/L (ref 43–160)
ALT SERPL-CCNC: 5 U/L (ref 10–40)
ANION GAP SERPL CALC-SCNC: 12 MMOL/L (ref 8–19)
AST SERPL-CCNC: 11 U/L (ref 15–50)
BASOPHILS # BLD AUTO: 0.03 10*3/UL (ref 0–0.2)
BASOPHILS NFR BLD AUTO: 0.4 % (ref 0–3)
BILIRUB SERPL-MCNC: 0.21 MG/DL (ref 0.2–1.3)
BUN SERPL-MCNC: 13 MG/DL (ref 5–25)
BUN/CREAT SERPL: 14 {RATIO} (ref 6–20)
CALCIUM SERPL-MCNC: 9.6 MG/DL (ref 8.7–10.4)
CHLORIDE SERPL-SCNC: 105 MMOL/L (ref 99–111)
CONV ABS IMM GRAN: 0.01 10*3/UL (ref 0–0.54)
CONV CO2: 24 MMOL/L (ref 22–32)
CONV EOSINOPHILS PERCENT BY MANUAL COUNT: 2.8 % (ref 0–7)
CONV IMMATURE GRAN: 0.1 % (ref 0–0.4)
CONV TOTAL PROTEIN: 6.4 G/DL (ref 6.3–8.2)
CREAT UR-MCNC: 0.91 MG/DL (ref 0.5–0.9)
EOSINOPHIL # BLD MANUAL: 0.19 10*3/UL (ref 0–0.7)
ERYTHROCYTE [DISTWIDTH] IN BLOOD BY AUTOMATED COUNT: 14.5 % (ref 11.5–14.5)
ERYTHROCYTE [DISTWIDTH] IN BLOOD BY AUTOMATED COUNT: 49.2 FL
GFR SERPLBLD BASED ON 1.73 SQ M-ARVRAT: >60 ML/MIN/{1.73_M2}
GLOBULIN UR ELPH-MCNC: 2.6 G/DL (ref 2–3.5)
GLUCOSE SERPL-MCNC: 105 MG/DL (ref 65–99)
HBA1C MFR BLD: 12.8 G/DL (ref 12–16)
HCT VFR BLD AUTO: 39 % (ref 37–47)
LYMPHOCYTES # BLD AUTO: 1.86 10*3/UL (ref 1–5)
LYMPHOCYTES NFR BLD AUTO: 27 % (ref 20–45)
MCH RBC QN AUTO: 29.6 PG (ref 27–31)
MCHC RBC AUTO-ENTMCNC: 32.8 G/DL (ref 33–37)
MCV RBC AUTO: 90.3 FL (ref 81–99)
MONOCYTES # BLD AUTO: 0.64 10*3/UL (ref 0.2–1.2)
MONOCYTES NFR BLD MANUAL: 9.3 % (ref 3–10)
NEUTROPHILS # BLD AUTO: 4.17 10*3/UL (ref 2–8)
NEUTROPHILS NFR BLD MANUAL: 60.4 % (ref 30–85)
OSMOLALITY SERPL CALC.SUM OF ELEC: 284 MOSM/KG (ref 273–304)
PLATELET # BLD AUTO: 299 10*3/UL (ref 130–400)
PMV BLD AUTO: 8.9 FL (ref 7.4–10.4)
POTASSIUM SERPL-SCNC: 3.9 MMOL/L (ref 3.5–5.3)
RBC MORPH BLD: 4.32 10*6/UL (ref 4.2–5.4)
SODIUM SERPL-SCNC: 137 MMOL/L (ref 135–147)
WBC # BLD AUTO: 6.9 10*3/UL (ref 4.8–10.8)

## 2020-10-13 LAB
ALBUMIN SERPL-MCNC: 3.7 G/DL (ref 3.5–5)
ALBUMIN/GLOB SERPL: 1.4 {RATIO} (ref 1.4–2.6)
ALP SERPL-CCNC: 90 U/L (ref 43–160)
ALT SERPL-CCNC: 5 U/L (ref 10–40)
ANION GAP SERPL CALC-SCNC: 12 MMOL/L (ref 8–19)
AST SERPL-CCNC: 10 U/L (ref 15–50)
BASOPHILS # BLD AUTO: 0.04 10*3/UL (ref 0–0.2)
BASOPHILS NFR BLD AUTO: 0.5 % (ref 0–3)
BILIRUB SERPL-MCNC: 0.23 MG/DL (ref 0.2–1.3)
BUN SERPL-MCNC: 11 MG/DL (ref 5–25)
BUN/CREAT SERPL: 12 {RATIO} (ref 6–20)
CALCIUM SERPL-MCNC: 9.7 MG/DL (ref 8.7–10.4)
CHLORIDE SERPL-SCNC: 111 MMOL/L (ref 99–111)
CONV ABS IMM GRAN: 0.01 10*3/UL (ref 0–0.54)
CONV CO2: 24 MMOL/L (ref 22–32)
CONV EOSINOPHILS PERCENT BY MANUAL COUNT: 2.5 % (ref 0–7)
CONV IMMATURE GRAN: 0.1 % (ref 0–0.4)
CONV TOTAL PROTEIN: 6.4 G/DL (ref 6.3–8.2)
CREAT UR-MCNC: 0.91 MG/DL (ref 0.5–0.9)
EOSINOPHIL # BLD MANUAL: 0.21 10*3/UL (ref 0–0.7)
ERYTHROCYTE [DISTWIDTH] IN BLOOD BY AUTOMATED COUNT: 13.6 % (ref 11.5–14.5)
ERYTHROCYTE [DISTWIDTH] IN BLOOD BY AUTOMATED COUNT: 45.8 FL
GFR SERPLBLD BASED ON 1.73 SQ M-ARVRAT: >60 ML/MIN/{1.73_M2}
GLOBULIN UR ELPH-MCNC: 2.7 G/DL (ref 2–3.5)
GLUCOSE SERPL-MCNC: 90 MG/DL (ref 65–99)
HBA1C MFR BLD: 13.2 G/DL (ref 12–16)
HCT VFR BLD AUTO: 40.3 % (ref 37–47)
LYMPHOCYTES # BLD AUTO: 1.99 10*3/UL (ref 1–5)
LYMPHOCYTES NFR BLD AUTO: 24 % (ref 20–45)
MCH RBC QN AUTO: 29.6 PG (ref 27–31)
MCHC RBC AUTO-ENTMCNC: 32.8 G/DL (ref 33–37)
MCV RBC AUTO: 90.4 FL (ref 81–99)
MONOCYTES # BLD AUTO: 0.61 10*3/UL (ref 0.2–1.2)
MONOCYTES NFR BLD MANUAL: 7.3 % (ref 3–10)
NEUTROPHILS # BLD AUTO: 5.44 10*3/UL (ref 2–8)
NEUTROPHILS NFR BLD MANUAL: 65.6 % (ref 30–85)
OSMOLALITY SERPL CALC.SUM OF ELEC: 295 MOSM/KG (ref 273–304)
PLATELET # BLD AUTO: 325 10*3/UL (ref 130–400)
PMV BLD AUTO: 9 FL (ref 7.4–10.4)
POTASSIUM SERPL-SCNC: 4.1 MMOL/L (ref 3.5–5.3)
RBC MORPH BLD: 4.46 10*6/UL (ref 4.2–5.4)
SODIUM SERPL-SCNC: 143 MMOL/L (ref 135–147)
T4 FREE SERPL-MCNC: 0.9 NG/DL (ref 0.9–1.8)
TSH SERPL-ACNC: 3.72 M[IU]/L (ref 0.27–4.2)
WBC # BLD AUTO: 8.3 10*3/UL (ref 4.8–10.8)

## 2020-10-19 ENCOUNTER — HOSPITAL ENCOUNTER (OUTPATIENT)
Dept: RADIATION ONCOLOGY | Facility: HOSPITAL | Age: 75
Discharge: HOME | End: 2020-10-19
Attending: RADIOLOGY

## 2020-11-03 ENCOUNTER — OFFICE VISIT CONVERTED (OUTPATIENT)
Dept: ONCOLOGY | Facility: HOSPITAL | Age: 75
End: 2020-11-03
Attending: INTERNAL MEDICINE

## 2020-11-03 LAB
ALBUMIN SERPL-MCNC: 3.8 G/DL (ref 3.5–5)
ALBUMIN/GLOB SERPL: 1.4 {RATIO} (ref 1.4–2.6)
ALP SERPL-CCNC: 101 U/L (ref 43–160)
ALT SERPL-CCNC: 5 U/L (ref 10–40)
ANION GAP SERPL CALC-SCNC: 13 MMOL/L (ref 8–19)
AST SERPL-CCNC: 10 U/L (ref 15–50)
BASOPHILS # BLD AUTO: 0.05 10*3/UL (ref 0–0.2)
BASOPHILS NFR BLD AUTO: 0.7 % (ref 0–3)
BILIRUB SERPL-MCNC: 0.29 MG/DL (ref 0.2–1.3)
BUN SERPL-MCNC: 13 MG/DL (ref 5–25)
BUN/CREAT SERPL: 13 {RATIO} (ref 6–20)
CALCIUM SERPL-MCNC: 9.8 MG/DL (ref 8.7–10.4)
CHLORIDE SERPL-SCNC: 102 MMOL/L (ref 99–111)
CONV ABS IMM GRAN: 0.01 10*3/UL (ref 0–0.54)
CONV CO2: 24 MMOL/L (ref 22–32)
CONV EOSINOPHILS PERCENT BY MANUAL COUNT: 2.3 % (ref 0–7)
CONV IMMATURE GRAN: 0.1 % (ref 0–0.4)
CONV TOTAL PROTEIN: 6.5 G/DL (ref 6.3–8.2)
CREAT UR-MCNC: 0.99 MG/DL (ref 0.5–0.9)
EOSINOPHIL # BLD MANUAL: 0.17 10*3/UL (ref 0–0.7)
ERYTHROCYTE [DISTWIDTH] IN BLOOD BY AUTOMATED COUNT: 13.5 % (ref 11.5–14.5)
ERYTHROCYTE [DISTWIDTH] IN BLOOD BY AUTOMATED COUNT: 44.1 FL
GFR SERPLBLD BASED ON 1.73 SQ M-ARVRAT: 56 ML/MIN/{1.73_M2}
GLOBULIN UR ELPH-MCNC: 2.7 G/DL (ref 2–3.5)
GLUCOSE SERPL-MCNC: 97 MG/DL (ref 65–99)
HBA1C MFR BLD: 13 G/DL (ref 12–16)
HCT VFR BLD AUTO: 38.4 % (ref 37–47)
LYMPHOCYTES # BLD AUTO: 2.23 10*3/UL (ref 1–5)
LYMPHOCYTES NFR BLD AUTO: 29.9 % (ref 20–45)
MCH RBC QN AUTO: 30.2 PG (ref 27–31)
MCHC RBC AUTO-ENTMCNC: 33.9 G/DL (ref 33–37)
MCV RBC AUTO: 89.1 FL (ref 81–99)
MONOCYTES # BLD AUTO: 0.59 10*3/UL (ref 0.2–1.2)
MONOCYTES NFR BLD MANUAL: 7.9 % (ref 3–10)
NEUTROPHILS # BLD AUTO: 4.4 10*3/UL (ref 2–8)
NEUTROPHILS NFR BLD MANUAL: 59.1 % (ref 30–85)
OSMOLALITY SERPL CALC.SUM OF ELEC: 280 MOSM/KG (ref 273–304)
PLATELET # BLD AUTO: 290 10*3/UL (ref 130–400)
PMV BLD AUTO: 9.2 FL (ref 7.4–10.4)
POTASSIUM SERPL-SCNC: 3.5 MMOL/L (ref 3.5–5.3)
RBC MORPH BLD: 4.31 10*6/UL (ref 4.2–5.4)
SODIUM SERPL-SCNC: 135 MMOL/L (ref 135–147)
WBC # BLD AUTO: 7.45 10*3/UL (ref 4.8–10.8)

## 2020-11-16 ENCOUNTER — HOSPITAL ENCOUNTER (OUTPATIENT)
Dept: MRI IMAGING | Facility: HOSPITAL | Age: 75
Discharge: HOME | End: 2020-11-16
Attending: INTERNAL MEDICINE

## 2020-11-24 ENCOUNTER — OFFICE VISIT CONVERTED (OUTPATIENT)
Dept: ONCOLOGY | Facility: HOSPITAL | Age: 75
End: 2020-11-24
Attending: INTERNAL MEDICINE

## 2020-11-24 LAB
ALBUMIN SERPL-MCNC: 3.8 G/DL (ref 3.5–5)
ALBUMIN/GLOB SERPL: 1.4 {RATIO} (ref 1.4–2.6)
ALP SERPL-CCNC: 100 U/L (ref 43–160)
ALT SERPL-CCNC: 6 U/L (ref 10–40)
ANION GAP SERPL CALC-SCNC: 11 MMOL/L (ref 8–19)
AST SERPL-CCNC: 12 U/L (ref 15–50)
BASOPHILS # BLD AUTO: 0.03 10*3/UL (ref 0–0.2)
BASOPHILS NFR BLD AUTO: 0.4 % (ref 0–3)
BILIRUB SERPL-MCNC: 0.3 MG/DL (ref 0.2–1.3)
BUN SERPL-MCNC: 11 MG/DL (ref 5–25)
BUN/CREAT SERPL: 10 {RATIO} (ref 6–20)
CALCIUM SERPL-MCNC: 9.9 MG/DL (ref 8.7–10.4)
CHLORIDE SERPL-SCNC: 105 MMOL/L (ref 99–111)
CONV ABS IMM GRAN: 0.01 10*3/UL (ref 0–0.54)
CONV CO2: 25 MMOL/L (ref 22–32)
CONV EOSINOPHILS PERCENT BY MANUAL COUNT: 2 % (ref 0–7)
CONV IMMATURE GRAN: 0.1 % (ref 0–0.4)
CONV TOTAL PROTEIN: 6.5 G/DL (ref 6.3–8.2)
CREAT UR-MCNC: 1.06 MG/DL (ref 0.5–0.9)
EOSINOPHIL # BLD MANUAL: 0.15 10*3/UL (ref 0–0.7)
ERYTHROCYTE [DISTWIDTH] IN BLOOD BY AUTOMATED COUNT: 13.8 % (ref 11.5–14.5)
ERYTHROCYTE [DISTWIDTH] IN BLOOD BY AUTOMATED COUNT: 45.3 FL
GFR SERPLBLD BASED ON 1.73 SQ M-ARVRAT: 51 ML/MIN/{1.73_M2}
GLOBULIN UR ELPH-MCNC: 2.7 G/DL (ref 2–3.5)
GLUCOSE SERPL-MCNC: 118 MG/DL (ref 65–99)
HBA1C MFR BLD: 12.6 G/DL (ref 12–16)
HCT VFR BLD AUTO: 38.4 % (ref 37–47)
LYMPHOCYTES # BLD AUTO: 2.19 10*3/UL (ref 1–5)
LYMPHOCYTES NFR BLD AUTO: 28.5 % (ref 20–45)
MCH RBC QN AUTO: 29.4 PG (ref 27–31)
MCHC RBC AUTO-ENTMCNC: 32.8 G/DL (ref 33–37)
MCV RBC AUTO: 89.7 FL (ref 81–99)
MONOCYTES # BLD AUTO: 0.6 10*3/UL (ref 0.2–1.2)
MONOCYTES NFR BLD MANUAL: 7.8 % (ref 3–10)
NEUTROPHILS # BLD AUTO: 4.71 10*3/UL (ref 2–8)
NEUTROPHILS NFR BLD MANUAL: 61.2 % (ref 30–85)
OSMOLALITY SERPL CALC.SUM OF ELEC: 285 MOSM/KG (ref 273–304)
PLATELET # BLD AUTO: 297 10*3/UL (ref 130–400)
PMV BLD AUTO: 9 FL (ref 7.4–10.4)
POTASSIUM SERPL-SCNC: 3.7 MMOL/L (ref 3.5–5.3)
RBC MORPH BLD: 4.28 10*6/UL (ref 4.2–5.4)
SODIUM SERPL-SCNC: 137 MMOL/L (ref 135–147)
T4 FREE SERPL-MCNC: 1 NG/DL (ref 0.9–1.8)
TSH SERPL-ACNC: 4.44 M[IU]/L (ref 0.27–4.2)
WBC # BLD AUTO: 7.69 10*3/UL (ref 4.8–10.8)

## 2020-12-07 ENCOUNTER — HOSPITAL ENCOUNTER (OUTPATIENT)
Dept: PET IMAGING | Facility: HOSPITAL | Age: 75
Discharge: HOME | End: 2020-12-07
Attending: INTERNAL MEDICINE

## 2020-12-15 ENCOUNTER — OFFICE VISIT CONVERTED (OUTPATIENT)
Dept: ONCOLOGY | Facility: HOSPITAL | Age: 75
End: 2020-12-15
Attending: INTERNAL MEDICINE

## 2020-12-15 ENCOUNTER — HOSPITAL ENCOUNTER (OUTPATIENT)
Dept: OTHER | Facility: HOSPITAL | Age: 75
Setting detail: RECURRING SERIES
Discharge: HOME | End: 2021-03-15
Attending: INTERNAL MEDICINE

## 2020-12-15 LAB
ALBUMIN SERPL-MCNC: 3.9 G/DL (ref 3.5–5)
ALBUMIN/GLOB SERPL: 1.4 {RATIO} (ref 1.4–2.6)
ALP SERPL-CCNC: 102 U/L (ref 43–160)
ALT SERPL-CCNC: 7 U/L (ref 10–40)
ANION GAP SERPL CALC-SCNC: 11 MMOL/L (ref 8–19)
AST SERPL-CCNC: 12 U/L (ref 15–50)
BASOPHILS # BLD AUTO: 0.04 10*3/UL (ref 0–0.2)
BASOPHILS NFR BLD AUTO: 0.5 % (ref 0–3)
BILIRUB SERPL-MCNC: 0.38 MG/DL (ref 0.2–1.3)
BUN SERPL-MCNC: 13 MG/DL (ref 5–25)
BUN/CREAT SERPL: 12 {RATIO} (ref 6–20)
CALCIUM SERPL-MCNC: 9.5 MG/DL (ref 8.7–10.4)
CHLORIDE SERPL-SCNC: 101 MMOL/L (ref 99–111)
CONV ABS IMM GRAN: 0.01 10*3/UL (ref 0–0.54)
CONV CO2: 25 MMOL/L (ref 22–32)
CONV EOSINOPHILS PERCENT BY MANUAL COUNT: 1.5 % (ref 0–7)
CONV IMMATURE GRAN: 0.1 % (ref 0–0.4)
CONV TOTAL PROTEIN: 6.7 G/DL (ref 6.3–8.2)
CREAT UR-MCNC: 1.13 MG/DL (ref 0.5–0.9)
EOSINOPHIL # BLD MANUAL: 0.11 10*3/UL (ref 0–0.7)
ERYTHROCYTE [DISTWIDTH] IN BLOOD BY AUTOMATED COUNT: 14.1 % (ref 11.5–14.5)
ERYTHROCYTE [DISTWIDTH] IN BLOOD BY AUTOMATED COUNT: 46.2 FL
GFR SERPLBLD BASED ON 1.73 SQ M-ARVRAT: 48 ML/MIN/{1.73_M2}
GLOBULIN UR ELPH-MCNC: 2.8 G/DL (ref 2–3.5)
GLUCOSE SERPL-MCNC: 121 MG/DL (ref 65–99)
HBA1C MFR BLD: 12.7 G/DL (ref 12–16)
HCT VFR BLD AUTO: 39.3 % (ref 37–47)
LYMPHOCYTES # BLD AUTO: 1.97 10*3/UL (ref 1–5)
LYMPHOCYTES NFR BLD AUTO: 26.5 % (ref 20–45)
MCH RBC QN AUTO: 28.7 PG (ref 27–31)
MCHC RBC AUTO-ENTMCNC: 32.3 G/DL (ref 33–37)
MCV RBC AUTO: 88.9 FL (ref 81–99)
MONOCYTES # BLD AUTO: 0.62 10*3/UL (ref 0.2–1.2)
MONOCYTES NFR BLD MANUAL: 8.3 % (ref 3–10)
NEUTROPHILS # BLD AUTO: 4.68 10*3/UL (ref 2–8)
NEUTROPHILS NFR BLD MANUAL: 63.1 % (ref 30–85)
OSMOLALITY SERPL CALC.SUM OF ELEC: 277 MOSM/KG (ref 273–304)
PLATELET # BLD AUTO: 291 10*3/UL (ref 130–400)
PMV BLD AUTO: 8.9 FL (ref 7.4–10.4)
POTASSIUM SERPL-SCNC: 3.9 MMOL/L (ref 3.5–5.3)
RBC MORPH BLD: 4.42 10*6/UL (ref 4.2–5.4)
SODIUM SERPL-SCNC: 133 MMOL/L (ref 135–147)
WBC # BLD AUTO: 7.43 10*3/UL (ref 4.8–10.8)

## 2021-01-05 LAB
ALBUMIN SERPL-MCNC: 3.9 G/DL (ref 3.5–5)
ALBUMIN/GLOB SERPL: 1.4 {RATIO} (ref 1.4–2.6)
ALP SERPL-CCNC: 101 U/L (ref 43–160)
ALT SERPL-CCNC: 7 U/L (ref 10–40)
ANION GAP SERPL CALC-SCNC: 9 MMOL/L (ref 8–19)
AST SERPL-CCNC: 11 U/L (ref 15–50)
BASOPHILS # BLD AUTO: 0.02 10*3/UL (ref 0–0.2)
BASOPHILS NFR BLD AUTO: 0.3 % (ref 0–3)
BILIRUB SERPL-MCNC: 0.36 MG/DL (ref 0.2–1.3)
BUN SERPL-MCNC: 16 MG/DL (ref 5–25)
BUN/CREAT SERPL: 17 {RATIO} (ref 6–20)
CALCIUM SERPL-MCNC: 9.6 MG/DL (ref 8.7–10.4)
CHLORIDE SERPL-SCNC: 107 MMOL/L (ref 99–111)
CONV ABS IMM GRAN: 0.01 10*3/UL (ref 0–0.54)
CONV CO2: 25 MMOL/L (ref 22–32)
CONV EOSINOPHILS PERCENT BY MANUAL COUNT: 2.2 % (ref 0–7)
CONV IMMATURE GRAN: 0.1 % (ref 0–0.4)
CONV TOTAL PROTEIN: 6.6 G/DL (ref 6.3–8.2)
CREAT UR-MCNC: 0.95 MG/DL (ref 0.5–0.9)
EOSINOPHIL # BLD MANUAL: 0.15 10*3/UL (ref 0–0.7)
ERYTHROCYTE [DISTWIDTH] IN BLOOD BY AUTOMATED COUNT: 14.3 % (ref 11.5–14.5)
ERYTHROCYTE [DISTWIDTH] IN BLOOD BY AUTOMATED COUNT: 47.7 FL
GFR SERPLBLD BASED ON 1.73 SQ M-ARVRAT: 59 ML/MIN/{1.73_M2}
GLOBULIN UR ELPH-MCNC: 2.7 G/DL (ref 2–3.5)
GLUCOSE SERPL-MCNC: 99 MG/DL (ref 65–99)
HBA1C MFR BLD: 12.8 G/DL (ref 12–16)
HCT VFR BLD AUTO: 39.1 % (ref 37–47)
LYMPHOCYTES # BLD AUTO: 1.87 10*3/UL (ref 1–5)
LYMPHOCYTES NFR BLD AUTO: 27.2 % (ref 20–45)
MCH RBC QN AUTO: 29.5 PG (ref 27–31)
MCHC RBC AUTO-ENTMCNC: 32.7 G/DL (ref 33–37)
MCV RBC AUTO: 90.1 FL (ref 81–99)
MONOCYTES # BLD AUTO: 0.59 10*3/UL (ref 0.2–1.2)
MONOCYTES NFR BLD MANUAL: 8.6 % (ref 3–10)
NEUTROPHILS # BLD AUTO: 4.24 10*3/UL (ref 2–8)
NEUTROPHILS NFR BLD MANUAL: 61.6 % (ref 30–85)
OSMOLALITY SERPL CALC.SUM OF ELEC: 285 MOSM/KG (ref 273–304)
PLATELET # BLD AUTO: 274 10*3/UL (ref 130–400)
PMV BLD AUTO: 9.1 FL (ref 7.4–10.4)
POTASSIUM SERPL-SCNC: 4 MMOL/L (ref 3.5–5.3)
RBC MORPH BLD: 4.34 10*6/UL (ref 4.2–5.4)
SODIUM SERPL-SCNC: 137 MMOL/L (ref 135–147)
WBC # BLD AUTO: 6.88 10*3/UL (ref 4.8–10.8)

## 2021-01-20 ENCOUNTER — HOSPITAL ENCOUNTER (OUTPATIENT)
Dept: RADIATION ONCOLOGY | Facility: HOSPITAL | Age: 76
Discharge: HOME | End: 2021-01-20
Attending: RADIOLOGY

## 2021-01-26 ENCOUNTER — OFFICE VISIT CONVERTED (OUTPATIENT)
Dept: ONCOLOGY | Facility: HOSPITAL | Age: 76
End: 2021-01-26
Attending: INTERNAL MEDICINE

## 2021-01-26 LAB
ALBUMIN SERPL-MCNC: 4 G/DL (ref 3.5–5)
ALBUMIN/GLOB SERPL: 1.4 {RATIO} (ref 1.4–2.6)
ALP SERPL-CCNC: 108 U/L (ref 43–160)
ALT SERPL-CCNC: 7 U/L (ref 10–40)
ANION GAP SERPL CALC-SCNC: 11 MMOL/L (ref 8–19)
AST SERPL-CCNC: 13 U/L (ref 15–50)
BASOPHILS # BLD AUTO: 0.03 10*3/UL (ref 0–0.2)
BASOPHILS NFR BLD AUTO: 0.4 % (ref 0–3)
BILIRUB SERPL-MCNC: 0.33 MG/DL (ref 0.2–1.3)
BUN SERPL-MCNC: 12 MG/DL (ref 5–25)
BUN/CREAT SERPL: 13 {RATIO} (ref 6–20)
CALCIUM SERPL-MCNC: 9.7 MG/DL (ref 8.7–10.4)
CHLORIDE SERPL-SCNC: 104 MMOL/L (ref 99–111)
CONV ABS IMM GRAN: 0.01 10*3/UL (ref 0–0.54)
CONV CO2: 24 MMOL/L (ref 22–32)
CONV EOSINOPHILS PERCENT BY MANUAL COUNT: 1.3 % (ref 0–7)
CONV IMMATURE GRAN: 0.1 % (ref 0–0.4)
CONV TOTAL PROTEIN: 6.9 G/DL (ref 6.3–8.2)
CREAT UR-MCNC: 0.95 MG/DL (ref 0.5–0.9)
EOSINOPHIL # BLD MANUAL: 0.1 10*3/UL (ref 0–0.7)
ERYTHROCYTE [DISTWIDTH] IN BLOOD BY AUTOMATED COUNT: 14.4 % (ref 11.5–14.5)
ERYTHROCYTE [DISTWIDTH] IN BLOOD BY AUTOMATED COUNT: 47.7 FL
GFR SERPLBLD BASED ON 1.73 SQ M-ARVRAT: 59 ML/MIN/{1.73_M2}
GLOBULIN UR ELPH-MCNC: 2.9 G/DL (ref 2–3.5)
GLUCOSE SERPL-MCNC: 110 MG/DL (ref 65–99)
HBA1C MFR BLD: 12.7 G/DL (ref 12–16)
HCT VFR BLD AUTO: 38.8 % (ref 37–47)
LYMPHOCYTES # BLD AUTO: 1.95 10*3/UL (ref 1–5)
LYMPHOCYTES NFR BLD AUTO: 24.6 % (ref 20–45)
MCH RBC QN AUTO: 29.5 PG (ref 27–31)
MCHC RBC AUTO-ENTMCNC: 32.7 G/DL (ref 33–37)
MCV RBC AUTO: 90 FL (ref 81–99)
MONOCYTES # BLD AUTO: 0.67 10*3/UL (ref 0.2–1.2)
MONOCYTES NFR BLD MANUAL: 8.5 % (ref 3–10)
NEUTROPHILS # BLD AUTO: 5.16 10*3/UL (ref 2–8)
NEUTROPHILS NFR BLD MANUAL: 65.1 % (ref 30–85)
OSMOLALITY SERPL CALC.SUM OF ELEC: 280 MOSM/KG (ref 273–304)
PLATELET # BLD AUTO: 270 10*3/UL (ref 130–400)
PMV BLD AUTO: 9 FL (ref 7.4–10.4)
POTASSIUM SERPL-SCNC: 3.8 MMOL/L (ref 3.5–5.3)
RBC MORPH BLD: 4.31 10*6/UL (ref 4.2–5.4)
SODIUM SERPL-SCNC: 135 MMOL/L (ref 135–147)
T4 FREE SERPL-MCNC: 1.1 NG/DL (ref 0.9–1.8)
TSH SERPL-ACNC: 3.86 M[IU]/L (ref 0.27–4.2)
WBC # BLD AUTO: 7.92 10*3/UL (ref 4.8–10.8)

## 2021-02-26 LAB
ALBUMIN SERPL-MCNC: 4 G/DL (ref 3.5–5)
ALBUMIN/GLOB SERPL: 1.4 {RATIO} (ref 1.4–2.6)
ALP SERPL-CCNC: 104 U/L (ref 43–160)
ALT SERPL-CCNC: 8 U/L (ref 10–40)
ANION GAP SERPL CALC-SCNC: 12 MMOL/L (ref 8–19)
AST SERPL-CCNC: 14 U/L (ref 15–50)
BASOPHILS # BLD AUTO: 0.03 10*3/UL (ref 0–0.2)
BASOPHILS NFR BLD AUTO: 0.4 % (ref 0–3)
BILIRUB SERPL-MCNC: 0.21 MG/DL (ref 0.2–1.3)
BUN SERPL-MCNC: 16 MG/DL (ref 5–25)
BUN/CREAT SERPL: 16 {RATIO} (ref 6–20)
CALCIUM SERPL-MCNC: 9.7 MG/DL (ref 8.7–10.4)
CHLORIDE SERPL-SCNC: 102 MMOL/L (ref 99–111)
CONV ABS IMM GRAN: 0.01 10*3/UL (ref 0–0.54)
CONV CO2: 24 MMOL/L (ref 22–32)
CONV EOSINOPHILS PERCENT BY MANUAL COUNT: 2 % (ref 0–7)
CONV IMMATURE GRAN: 0.1 % (ref 0–0.4)
CONV TOTAL PROTEIN: 6.8 G/DL (ref 6.3–8.2)
CREAT UR-MCNC: 0.98 MG/DL (ref 0.5–0.9)
EOSINOPHIL # BLD MANUAL: 0.15 10*3/UL (ref 0–0.7)
ERYTHROCYTE [DISTWIDTH] IN BLOOD BY AUTOMATED COUNT: 13.5 % (ref 11.5–14.5)
ERYTHROCYTE [DISTWIDTH] IN BLOOD BY AUTOMATED COUNT: 45.3 FL
GFR SERPLBLD BASED ON 1.73 SQ M-ARVRAT: 57 ML/MIN/{1.73_M2}
GLOBULIN UR ELPH-MCNC: 2.8 G/DL (ref 2–3.5)
GLUCOSE SERPL-MCNC: 114 MG/DL (ref 65–99)
HBA1C MFR BLD: 13.1 G/DL (ref 12–16)
HCT VFR BLD AUTO: 40.1 % (ref 37–47)
LYMPHOCYTES # BLD AUTO: 1.83 10*3/UL (ref 1–5)
LYMPHOCYTES NFR BLD AUTO: 24.5 % (ref 20–45)
MCH RBC QN AUTO: 29.5 PG (ref 27–31)
MCHC RBC AUTO-ENTMCNC: 32.7 G/DL (ref 33–37)
MCV RBC AUTO: 90.3 FL (ref 81–99)
MONOCYTES # BLD AUTO: 0.6 10*3/UL (ref 0.2–1.2)
MONOCYTES NFR BLD MANUAL: 8 % (ref 3–10)
NEUTROPHILS # BLD AUTO: 4.84 10*3/UL (ref 2–8)
NEUTROPHILS NFR BLD MANUAL: 65 % (ref 30–85)
OSMOLALITY SERPL CALC.SUM OF ELEC: 282 MOSM/KG (ref 273–304)
PLATELET # BLD AUTO: 266 10*3/UL (ref 130–400)
PMV BLD AUTO: 9.1 FL (ref 7.4–10.4)
POTASSIUM SERPL-SCNC: 3.4 MMOL/L (ref 3.5–5.3)
RBC MORPH BLD: 4.44 10*6/UL (ref 4.2–5.4)
SODIUM SERPL-SCNC: 135 MMOL/L (ref 135–147)
WBC # BLD AUTO: 7.46 10*3/UL (ref 4.8–10.8)

## 2021-03-08 ENCOUNTER — HOSPITAL ENCOUNTER (OUTPATIENT)
Dept: CT IMAGING | Facility: HOSPITAL | Age: 76
Discharge: HOME | End: 2021-03-08
Attending: INTERNAL MEDICINE

## 2021-03-23 ENCOUNTER — OFFICE VISIT CONVERTED (OUTPATIENT)
Dept: ONCOLOGY | Facility: HOSPITAL | Age: 76
End: 2021-03-23
Attending: INTERNAL MEDICINE

## 2021-03-23 LAB
ALBUMIN SERPL-MCNC: 4 G/DL (ref 3.5–5)
ALBUMIN/GLOB SERPL: 1.4 {RATIO} (ref 1.4–2.6)
ALP SERPL-CCNC: 104 U/L (ref 43–160)
ALT SERPL-CCNC: 11 U/L (ref 10–40)
ANION GAP SERPL CALC-SCNC: 13 MMOL/L (ref 8–19)
AST SERPL-CCNC: 16 U/L (ref 15–50)
BASOPHILS # BLD AUTO: 0.02 10*3/UL (ref 0–0.2)
BASOPHILS NFR BLD AUTO: 0.3 % (ref 0–3)
BILIRUB SERPL-MCNC: 0.21 MG/DL (ref 0.2–1.3)
BUN SERPL-MCNC: 15 MG/DL (ref 5–25)
BUN/CREAT SERPL: 16 {RATIO} (ref 6–20)
CALCIUM SERPL-MCNC: 9.6 MG/DL (ref 8.7–10.4)
CHLORIDE SERPL-SCNC: 102 MMOL/L (ref 99–111)
CONV ABS IMM GRAN: 0.01 10*3/UL (ref 0–0.54)
CONV CO2: 26 MMOL/L (ref 22–32)
CONV EOSINOPHILS PERCENT BY MANUAL COUNT: 1.8 % (ref 0–7)
CONV IMMATURE GRAN: 0.1 % (ref 0–0.4)
CONV TOTAL PROTEIN: 6.8 G/DL (ref 6.3–8.2)
CREAT UR-MCNC: 0.95 MG/DL (ref 0.5–0.9)
EOSINOPHIL # BLD MANUAL: 0.14 10*3/UL (ref 0–0.7)
ERYTHROCYTE [DISTWIDTH] IN BLOOD BY AUTOMATED COUNT: 13.2 % (ref 11.5–14.5)
ERYTHROCYTE [DISTWIDTH] IN BLOOD BY AUTOMATED COUNT: 43 FL
GFR SERPLBLD BASED ON 1.73 SQ M-ARVRAT: 59 ML/MIN/{1.73_M2}
GLOBULIN UR ELPH-MCNC: 2.8 G/DL (ref 2–3.5)
GLUCOSE SERPL-MCNC: 101 MG/DL (ref 65–99)
HBA1C MFR BLD: 13.3 G/DL (ref 12–16)
HCT VFR BLD AUTO: 39.5 % (ref 37–47)
LYMPHOCYTES # BLD AUTO: 2.2 10*3/UL (ref 1–5)
LYMPHOCYTES NFR BLD AUTO: 28.4 % (ref 20–45)
MCH RBC QN AUTO: 29.9 PG (ref 27–31)
MCHC RBC AUTO-ENTMCNC: 33.7 G/DL (ref 33–37)
MCV RBC AUTO: 88.8 FL (ref 81–99)
MONOCYTES # BLD AUTO: 0.59 10*3/UL (ref 0.2–1.2)
MONOCYTES NFR BLD MANUAL: 7.6 % (ref 3–10)
NEUTROPHILS # BLD AUTO: 4.78 10*3/UL (ref 2–8)
NEUTROPHILS NFR BLD MANUAL: 61.8 % (ref 30–85)
OSMOLALITY SERPL CALC.SUM OF ELEC: 285 MOSM/KG (ref 273–304)
PLATELET # BLD AUTO: 284 10*3/UL (ref 130–400)
PMV BLD AUTO: 8.7 FL (ref 7.4–10.4)
POTASSIUM SERPL-SCNC: 3.5 MMOL/L (ref 3.5–5.3)
RBC MORPH BLD: 4.45 10*6/UL (ref 4.2–5.4)
SODIUM SERPL-SCNC: 137 MMOL/L (ref 135–147)
T4 FREE SERPL-MCNC: 0.9 NG/DL (ref 0.9–1.8)
TSH SERPL-ACNC: 9.56 M[IU]/L (ref 0.27–4.2)
WBC # BLD AUTO: 7.74 10*3/UL (ref 4.8–10.8)

## 2021-04-13 LAB
ALBUMIN SERPL-MCNC: 3.9 G/DL (ref 3.5–5)
ALBUMIN/GLOB SERPL: 1.3 {RATIO} (ref 1.4–2.6)
ALP SERPL-CCNC: 104 U/L (ref 43–160)
ALT SERPL-CCNC: 7 U/L (ref 10–40)
ANION GAP SERPL CALC-SCNC: 12 MMOL/L (ref 8–19)
AST SERPL-CCNC: 13 U/L (ref 15–50)
BASOPHILS # BLD AUTO: 0.03 10*3/UL (ref 0–0.2)
BASOPHILS NFR BLD AUTO: 0.4 % (ref 0–3)
BILIRUB SERPL-MCNC: 0.28 MG/DL (ref 0.2–1.3)
BUN SERPL-MCNC: 17 MG/DL (ref 5–25)
BUN/CREAT SERPL: 16 {RATIO} (ref 6–20)
CALCIUM SERPL-MCNC: 10 MG/DL (ref 8.7–10.4)
CHLORIDE SERPL-SCNC: 100 MMOL/L (ref 99–111)
CONV ABS IMM GRAN: 0.02 10*3/UL (ref 0–0.54)
CONV CO2: 28 MMOL/L (ref 22–32)
CONV EOSINOPHILS PERCENT BY MANUAL COUNT: 2 % (ref 0–7)
CONV IMMATURE GRAN: 0.3 % (ref 0–0.4)
CONV TOTAL PROTEIN: 6.8 G/DL (ref 6.3–8.2)
CREAT UR-MCNC: 1.06 MG/DL (ref 0.5–0.9)
EOSINOPHIL # BLD MANUAL: 0.15 10*3/UL (ref 0–0.7)
ERYTHROCYTE [DISTWIDTH] IN BLOOD BY AUTOMATED COUNT: 13.5 % (ref 11.5–14.5)
ERYTHROCYTE [DISTWIDTH] IN BLOOD BY AUTOMATED COUNT: 43.5 FL
GFR SERPLBLD BASED ON 1.73 SQ M-ARVRAT: 51 ML/MIN/{1.73_M2}
GLOBULIN UR ELPH-MCNC: 2.9 G/DL (ref 2–3.5)
GLUCOSE SERPL-MCNC: 95 MG/DL (ref 65–99)
HBA1C MFR BLD: 13.7 G/DL (ref 12–16)
HCT VFR BLD AUTO: 41 % (ref 37–47)
LYMPHOCYTES # BLD AUTO: 2.47 10*3/UL (ref 1–5)
LYMPHOCYTES NFR BLD AUTO: 32.7 % (ref 20–45)
MCH RBC QN AUTO: 29.5 PG (ref 27–31)
MCHC RBC AUTO-ENTMCNC: 33.4 G/DL (ref 33–37)
MCV RBC AUTO: 88.4 FL (ref 81–99)
MONOCYTES # BLD AUTO: 0.65 10*3/UL (ref 0.2–1.2)
MONOCYTES NFR BLD MANUAL: 8.6 % (ref 3–10)
NEUTROPHILS # BLD AUTO: 4.23 10*3/UL (ref 2–8)
NEUTROPHILS NFR BLD MANUAL: 56 % (ref 30–85)
OSMOLALITY SERPL CALC.SUM OF ELEC: 283 MOSM/KG (ref 273–304)
PLATELET # BLD AUTO: 267 10*3/UL (ref 130–400)
PMV BLD AUTO: 8.9 FL (ref 7.4–10.4)
POTASSIUM SERPL-SCNC: 3.5 MMOL/L (ref 3.5–5.3)
RBC MORPH BLD: 4.64 10*6/UL (ref 4.2–5.4)
SODIUM SERPL-SCNC: 136 MMOL/L (ref 135–147)
WBC # BLD AUTO: 7.55 10*3/UL (ref 4.8–10.8)

## 2021-05-04 ENCOUNTER — HOSPITAL ENCOUNTER (OUTPATIENT)
Dept: OTHER | Facility: HOSPITAL | Age: 76
Setting detail: RECURRING SERIES
Discharge: HOME | End: 2021-05-04
Attending: INTERNAL MEDICINE

## 2021-05-04 ENCOUNTER — OFFICE VISIT CONVERTED (OUTPATIENT)
Dept: ONCOLOGY | Facility: HOSPITAL | Age: 76
End: 2021-05-04
Attending: INTERNAL MEDICINE

## 2021-05-04 LAB
ALBUMIN SERPL-MCNC: 3.8 G/DL (ref 3.5–5)
ALBUMIN/GLOB SERPL: 1.1 {RATIO} (ref 1.4–2.6)
ALP SERPL-CCNC: 101 U/L (ref 43–160)
ALT SERPL-CCNC: 8 U/L (ref 10–40)
ANION GAP SERPL CALC-SCNC: 15 MMOL/L (ref 8–19)
AST SERPL-CCNC: 14 U/L (ref 15–50)
BASOPHILS # BLD AUTO: 0.03 10*3/UL (ref 0–0.2)
BASOPHILS NFR BLD AUTO: 0.4 % (ref 0–3)
BILIRUB SERPL-MCNC: 0.34 MG/DL (ref 0.2–1.3)
BUN SERPL-MCNC: 16 MG/DL (ref 5–25)
BUN/CREAT SERPL: 16 {RATIO} (ref 6–20)
CALCIUM SERPL-MCNC: 9.6 MG/DL (ref 8.7–10.4)
CHLORIDE SERPL-SCNC: 100 MMOL/L (ref 99–111)
CONV ABS IMM GRAN: 0.01 10*3/UL (ref 0–0.54)
CONV CO2: 27 MMOL/L (ref 22–32)
CONV EOSINOPHILS PERCENT BY MANUAL COUNT: 2.4 % (ref 0–7)
CONV IMMATURE GRAN: 0.1 % (ref 0–0.4)
CONV TOTAL PROTEIN: 7.4 G/DL (ref 6.3–8.2)
CREAT UR-MCNC: 1.02 MG/DL (ref 0.5–0.9)
EOSINOPHIL # BLD MANUAL: 0.18 10*3/UL (ref 0–0.7)
ERYTHROCYTE [DISTWIDTH] IN BLOOD BY AUTOMATED COUNT: 13.6 % (ref 11.5–14.5)
ERYTHROCYTE [DISTWIDTH] IN BLOOD BY AUTOMATED COUNT: 43.7 FL
GFR SERPLBLD BASED ON 1.73 SQ M-ARVRAT: 54 ML/MIN/{1.73_M2}
GLOBULIN UR ELPH-MCNC: 3.6 G/DL (ref 2–3.5)
GLUCOSE SERPL-MCNC: 94 MG/DL (ref 65–99)
HBA1C MFR BLD: 13.7 G/DL (ref 12–16)
HCT VFR BLD AUTO: 41.3 % (ref 37–47)
LYMPHOCYTES # BLD AUTO: 2.12 10*3/UL (ref 1–5)
LYMPHOCYTES NFR BLD AUTO: 27.8 % (ref 20–45)
MCH RBC QN AUTO: 29.1 PG (ref 27–31)
MCHC RBC AUTO-ENTMCNC: 33.2 G/DL (ref 33–37)
MCV RBC AUTO: 87.9 FL (ref 81–99)
MONOCYTES # BLD AUTO: 0.64 10*3/UL (ref 0.2–1.2)
MONOCYTES NFR BLD MANUAL: 8.4 % (ref 3–10)
NEUTROPHILS # BLD AUTO: 4.65 10*3/UL (ref 2–8)
NEUTROPHILS NFR BLD MANUAL: 60.9 % (ref 30–85)
OSMOLALITY SERPL CALC.SUM OF ELEC: 287 MOSM/KG (ref 273–304)
PLATELET # BLD AUTO: 320 10*3/UL (ref 130–400)
PMV BLD AUTO: 8.8 FL (ref 7.4–10.4)
POTASSIUM SERPL-SCNC: 3.6 MMOL/L (ref 3.5–5.3)
RBC MORPH BLD: 4.7 10*6/UL (ref 4.2–5.4)
SODIUM SERPL-SCNC: 138 MMOL/L (ref 135–147)
T4 FREE SERPL-MCNC: 1.2 NG/DL (ref 0.9–1.8)
TSH SERPL-ACNC: 3.29 M[IU]/L (ref 0.27–4.2)
WBC # BLD AUTO: 7.63 10*3/UL (ref 4.8–10.8)

## 2021-05-15 VITALS — RESPIRATION RATE: 16 BRPM | BODY MASS INDEX: 26.9 KG/M2 | WEIGHT: 137 LBS | HEIGHT: 60 IN

## 2021-05-23 ENCOUNTER — TRANSCRIBE ORDERS (OUTPATIENT)
Dept: ONCOLOGY | Facility: HOSPITAL | Age: 76
End: 2021-05-23

## 2021-05-23 DIAGNOSIS — C34.00 MALIGNANT NEOPLASM OF MAIN BRONCHUS, UNSPECIFIED LATERALITY (HCC): Primary | ICD-10-CM

## 2021-05-28 VITALS
HEART RATE: 87 BPM | WEIGHT: 143.01 LBS | DIASTOLIC BLOOD PRESSURE: 61 MMHG | BODY MASS INDEX: 23.94 KG/M2 | RESPIRATION RATE: 16 BRPM | OXYGEN SATURATION: 98 % | HEART RATE: 81 BPM | RESPIRATION RATE: 20 BRPM | BODY MASS INDEX: 26.78 KG/M2 | TEMPERATURE: 98 F | SYSTOLIC BLOOD PRESSURE: 115 MMHG | RESPIRATION RATE: 18 BRPM | DIASTOLIC BLOOD PRESSURE: 81 MMHG | DIASTOLIC BLOOD PRESSURE: 58 MMHG | RESPIRATION RATE: 20 BRPM | HEART RATE: 64 BPM | TEMPERATURE: 97.9 F | TEMPERATURE: 96.9 F | TEMPERATURE: 97.1 F | WEIGHT: 135.58 LBS | HEART RATE: 110 BPM | SYSTOLIC BLOOD PRESSURE: 90 MMHG | TEMPERATURE: 97.2 F | BODY MASS INDEX: 24.89 KG/M2 | SYSTOLIC BLOOD PRESSURE: 121 MMHG | BODY MASS INDEX: 25.02 KG/M2 | SYSTOLIC BLOOD PRESSURE: 147 MMHG | BODY MASS INDEX: 28.08 KG/M2 | SYSTOLIC BLOOD PRESSURE: 108 MMHG | RESPIRATION RATE: 16 BRPM | TEMPERATURE: 97.3 F | HEART RATE: 104 BPM | HEART RATE: 85 BPM | HEIGHT: 60 IN | WEIGHT: 122.58 LBS | RESPIRATION RATE: 17 BRPM | SYSTOLIC BLOOD PRESSURE: 100 MMHG | HEART RATE: 109 BPM | SYSTOLIC BLOOD PRESSURE: 118 MMHG | RESPIRATION RATE: 17 BRPM | TEMPERATURE: 97.9 F | HEIGHT: 60 IN | DIASTOLIC BLOOD PRESSURE: 65 MMHG | OXYGEN SATURATION: 97 % | TEMPERATURE: 98.6 F | TEMPERATURE: 97.1 F | WEIGHT: 118.83 LBS | OXYGEN SATURATION: 97 % | HEIGHT: 59 IN | HEART RATE: 92 BPM | BODY MASS INDEX: 26.62 KG/M2 | HEIGHT: 60 IN | OXYGEN SATURATION: 95 % | WEIGHT: 128.09 LBS | BODY MASS INDEX: 23.21 KG/M2 | BODY MASS INDEX: 26.87 KG/M2 | WEIGHT: 137.57 LBS | SYSTOLIC BLOOD PRESSURE: 113 MMHG | HEART RATE: 82 BPM | BODY MASS INDEX: 27.79 KG/M2 | OXYGEN SATURATION: 98 % | WEIGHT: 137.13 LBS | SYSTOLIC BLOOD PRESSURE: 110 MMHG | WEIGHT: 126.54 LBS | HEIGHT: 59 IN | DIASTOLIC BLOOD PRESSURE: 54 MMHG | RESPIRATION RATE: 18 BRPM | TEMPERATURE: 98.5 F | DIASTOLIC BLOOD PRESSURE: 54 MMHG | RESPIRATION RATE: 18 BRPM | OXYGEN SATURATION: 98 % | DIASTOLIC BLOOD PRESSURE: 58 MMHG | OXYGEN SATURATION: 99 % | OXYGEN SATURATION: 95 % | OXYGEN SATURATION: 100 % | BODY MASS INDEX: 25.51 KG/M2 | DIASTOLIC BLOOD PRESSURE: 53 MMHG | WEIGHT: 123.46 LBS | DIASTOLIC BLOOD PRESSURE: 68 MMHG | WEIGHT: 141.54 LBS | DIASTOLIC BLOOD PRESSURE: 46 MMHG | SYSTOLIC BLOOD PRESSURE: 108 MMHG | RESPIRATION RATE: 16 BRPM | HEART RATE: 89 BPM | OXYGEN SATURATION: 93 %

## 2021-05-28 VITALS
TEMPERATURE: 98 F | HEART RATE: 88 BPM | TEMPERATURE: 97.8 F | SYSTOLIC BLOOD PRESSURE: 132 MMHG | HEART RATE: 73 BPM | TEMPERATURE: 97.4 F | BODY MASS INDEX: 23.91 KG/M2 | BODY MASS INDEX: 23.96 KG/M2 | RESPIRATION RATE: 20 BRPM | WEIGHT: 117.06 LBS | RESPIRATION RATE: 20 BRPM | WEIGHT: 118.83 LBS | BODY MASS INDEX: 23.73 KG/M2 | TEMPERATURE: 96.3 F | WEIGHT: 120.81 LBS | WEIGHT: 120.37 LBS | OXYGEN SATURATION: 100 % | OXYGEN SATURATION: 98 % | OXYGEN SATURATION: 98 % | SYSTOLIC BLOOD PRESSURE: 113 MMHG | DIASTOLIC BLOOD PRESSURE: 69 MMHG | DIASTOLIC BLOOD PRESSURE: 87 MMHG | SYSTOLIC BLOOD PRESSURE: 148 MMHG | RESPIRATION RATE: 18 BRPM | TEMPERATURE: 97.1 F | DIASTOLIC BLOOD PRESSURE: 68 MMHG | BODY MASS INDEX: 23.65 KG/M2 | HEART RATE: 70 BPM | SYSTOLIC BLOOD PRESSURE: 124 MMHG | RESPIRATION RATE: 16 BRPM | HEART RATE: 87 BPM | OXYGEN SATURATION: 100 % | SYSTOLIC BLOOD PRESSURE: 152 MMHG | DIASTOLIC BLOOD PRESSURE: 65 MMHG | BODY MASS INDEX: 23.26 KG/M2 | BODY MASS INDEX: 24.09 KG/M2 | RESPIRATION RATE: 18 BRPM | DIASTOLIC BLOOD PRESSURE: 81 MMHG | TEMPERATURE: 97.1 F | DIASTOLIC BLOOD PRESSURE: 78 MMHG | TEMPERATURE: 97.2 F | RESPIRATION RATE: 18 BRPM | BODY MASS INDEX: 23.56 KG/M2 | OXYGEN SATURATION: 98 % | OXYGEN SATURATION: 96 % | WEIGHT: 119.05 LBS | WEIGHT: 121.25 LBS | SYSTOLIC BLOOD PRESSURE: 117 MMHG | WEIGHT: 118.61 LBS | HEIGHT: 59 IN | OXYGEN SATURATION: 100 % | RESPIRATION RATE: 20 BRPM | HEART RATE: 82 BPM | HEART RATE: 85 BPM | BODY MASS INDEX: 24 KG/M2 | HEART RATE: 81 BPM | WEIGHT: 117.73 LBS | OXYGEN SATURATION: 99 % | HEIGHT: 59 IN | DIASTOLIC BLOOD PRESSURE: 53 MMHG | SYSTOLIC BLOOD PRESSURE: 113 MMHG | SYSTOLIC BLOOD PRESSURE: 145 MMHG | RESPIRATION RATE: 16 BRPM | HEART RATE: 67 BPM | DIASTOLIC BLOOD PRESSURE: 69 MMHG | TEMPERATURE: 97.8 F

## 2021-05-28 NOTE — PROGRESS NOTES
Patient: ANGEL DE JESUS     Acct: [id number]     Report: #[id number]  UNIT #: [id number]     : 1945    Encounter Date:2020  PRIMARY CARE: Heydi Julian  ***Signed***  --------------------------------------------------------------------------------------------------------------------  NURSE INTAKE      Visit Type      Established Patient Visit            Chief Complaint      LUNG CANCER            Referring Provider/Copies To      [facility]      Copies To:   Heydi Julian            History and Present Illness      Past Oncology Illness History      Ms. De Jesus is a 75 yo F with a new diagnosis of small cell lung cancer.  She was    a heavy smoker until last week when she came into the hospital with shortness of    breath. Initial chest x-ray was concerning for a mass.  She then had a CT chest     on 20 which showed a mass at least 5.1cm involving the right hilum, right     paratracheal and       subcarinal regions causing encasement and narrowing of the right main bronchus     and right middle lobe bronchus and occlusion of the right upper lobe bronchus     with subsequent complete atelectasis of the right upper and right middle lobes.     Of note, there is a large hiatal hernia with gastric volvulus but no evidence of    gastric outlet obstruction.  Biopsy on 1/3/20 was positive for small cell lung     cancer.              Brain MRI 1/10/20:         1. There is an apparent focus of small possible abnormal diffusion restriction     within the subcortical or deep white matter of the right frontal lobe.      Increased associated T2 and FLAIR signal are seen.  The findings may indicate a     small focus of subacute ischemic change.  Findings secondary to T2 shine through    could also have this appearance.  2. Evidence for small multifocal intracranial     metastatic foci along the inferior margin of the right frontal lobe as well as     the  peripheral medial posterior margin of the left cerebellum.  3. Nonspecific     white matter signal changes are observed likely due to chronic small vessel     ischemic changes and age related changes.  Associated diffuse volume loss is     noted.      MOHAMUD GARRETT MD       Electronically Signed and Approved By: MOHAMUD GARRETT MD    on 1/10/2020 at 8:18               PET/CT 1/13/20      CONCLUSION:         1. No significant change in size of a right perihilar/mediastinal mass with     hypermetabolic activity consistent with malignancy.  The mass obstructs the     right upper and right middle lobe bronchi with associated atelectasis, which is     also unchanged.      2. No evidence of metastatic disease outside the chest.      3. Chronic changes as described above.            Cycle 1 of carboplatin plus etoposide on 1/22/2020      Cycle 2 of carboplatin plus etoposide with Tecentriq on 2/4/2020            HPI - Oncology Interim      Patient comes in today prior to cycle 2 of chemotherapy.  She reports that she     has lost a significant amount weight.  She is not eating and drinking very well.     She is fatigued but otherwise has no specific complaints.  The large hematoma     on her right hip has improved in size.            Clinical Trial Participant      No            ECOG Performance Status      1            Most Recent Lab Findings      Laboratory Tests      2/4/20 08:18            2/4/20 09:04            PAST, FAMILY   Past Medical History      Past Medical History:  COPD, High Cholesterol, Hypertension, Short of Air      Hematology/Oncology (F):  Lung Cancer            Past Surgical History      None            Family History      Family History:  No Family History            Social History      Marital Status:        Number of Children:  3            Tobacco Use      Tobacco status:  Former smoker      Smoking history:  25-50 pack years            Alcohol Use      Alcohol intake:  0-2 drinks per day             Substance Use      Substance use:  Denies use            REVIEW OF SYSTEMS      General:  Admits: Appetite Change, Fatigue, Weight Loss;          Denies: Fever, Night Sweats, Weight Gain      Eye:  Denies Blurred Vision, Denies Corrective Lenses, Denies Diplopia, Denies     Vision Changes      ENT:  Denies Headache, Denies Hearing Loss, Denies Hoarseness, Denies Sore     Throat      Cardiovascular:  Denies Chest Pain, Denies Palpitations      Respiratory:  Denies: Coughing Blood, Productive Cough, Shortness of Air, Whe    ezing      Gastrointestinal:  Denies Bloody Stools, Denies Constipation, Denies Diarrhea,     Denies Nausea/Vomiting, Denies Problem Swallowing, Denies Unable to Control     Bowels      Genitourinary:  Denies Blood in Urine, Denies Incontinence, Denies Painful     Urination      Musculoskeletal:  Denies Back Pain, Denies Muscle Pain, Denies Painful Joints      Integumentary:  Denies Itching, Denies Lesions, Denies Rash      Neurologic:  Denies Dizziness, Denies Numbness\Tingling, Denies Seizures      Psychiatric:  Denies Anxiety, Denies Depression      Endocrine:  Denies Cold Intolerance, Denies Heat Intolerance      Hematologic/Lymphatic:  Denies Bruising, Denies Bleeding, Denies Enlarged Lymph     Nodes      Reproductive:  Denies: Menopause, Heavy Periods, Pregnant, Still Menstruating            VITAL SIGNS AND SCORES      Vitals      Weight 137 lbs 2.018 oz / 62.2 kg      Temperature 97.1 F / 36.17 C - Temporal      Pulse 81      Respirations 16      Blood Pressure 100/46 Sitting      Pulse Oximetry 95%, ROOM AIR            Pain Score      Experiencing any pain?:  No      Pain Scale Used:  Numerical      Pain Intensity:  0            Fatigue Score      Experiencing any fatigue?:  No      Fatigue (0-10 scale):  0 (none)            EXAM      General: Alert, cooperative, no acute distress      Eyes: Anicteric sclera, PERRLA      HEENT: Oropharynx clear, no exudates      Respiratory: CTAB,  normal respiratory effort      Abdomen: Normal active bowel sounds, no tenderness, no distention      Cardiovascular: RRR, no murmur, no peripheral edema      Skin: Normal tone, no rash, no lesions      Psychiatric: Appropriate affect, intact judgment      Neurologic: No focal sensory or motor deficits, no weakness, numbness, dizziness      Musculoskeletal: Normal muscle strength, normal muscle tone      Extremities: No clubbing, cyanosis, or deformities            PREVENTION      Hx Influenza Vaccination:  No      Date Influenza Vaccine Given:  Oct 1, 2018      Influenza Vaccine Declined:  No      2 or More Falls Past Year?:  No      Fall Past Year with Injury?:  No      Hx Pneumococcal Vaccination:  Yes      Encouraged to follow-up with:  PCP regarding preventative exams.      Chart initiated by      CHER AMADOR CMA            ALLERGY/MEDS      Allergies      Coded Allergies:             NO KNOWN ALLERGIES (Unverified , 2/4/20)            Medications      Last Reconciled on 2/11/20 17:05 by MELCHOR ENCINAS      Prochlorperazine Maleate (Prochlorperazine Maleate) 10 Mg Tab      10 MG PO Q6H PRN for NAUSEA AND/OR VOMITING, #60 TAB 3 Refills         Prov: MELCHOR ENCINAS         1/13/20       Ondansetron Odt (ONDANSETRON ODT) 8 Mg Tab.rapdis      8 MG PO Q8H PRN for NAUSEA, #30 TAB.RAPDIS 3 Refills         Prov: MELCHOR ENCINAS         1/13/20       HYDROcodone-Acetaminophen 5-325 Mg (HYDROcodone-Acetaminophen 5-325 Mg) 1 Each     Tablet      1-2 TAB PO Q4H for Post Surgical PAin, #8 TAB 0 Refills         Prov: Rick Sheets         1/9/20       predniSONE (predniSONE) 20 Mg Tablet      40 MG PO QDAY, TAB 0 Refills         Reported         1/8/20       Fluticasone (Flovent HFA) 220 Mcg Inhaler      2 PUFF INH RTBID, #1 INH 6 Refills         Prov: Tank Maya         1/7/20       amLODIPine (amLODIPine) 5 Mg Tablet      5 MG PO QDAY         Reported         1/2/20       Pravastatin Sodium (Pravastatin Sodium) 10 Mg  Tablet      10 MG PO QDAY, TAB         Reported         7/23/15       Losartan/Hydrochlorothiazide (Losartan/Hctz 100/25 Mg) 1 Tab Tablet      1 TAB PO QDAY, #30 TAB 0 Refills         Reported         7/23/15       MDI-Albuterol (Proventil HFA) 60 Puffs/Inh Puffs      2 PUFFS INH Q4-6HPRN         Reported         3/1/13       Omega-3 Fatty Acids/Fish Oil (Fish Oil 1000 Mg) 1,000 Mg Capsule      1000 MG PO QDAY, CAP         Reported         3/1/13       Aspirin EC (Aspirin EC) 81 Mg Tabec      81 MG PO QDAY         Reported         3/1/13      Medications Reviewed:  No Changes made to meds            IMPRESSION/PLAN      Impression      74-year-old female with extensive stage small cell lung cancer with brain     metastasis.            Diagnosis      Anemia - D64.9            Notes      New Diagnostics      * Ferritin Level, Routine         Dx: Anemia - D64.9      * Iron Profile, Routine            Plan      Extensive stage small cell lung cancer: Patient is here today for cycle 2 of     carboplatin plus etoposide.  I will also add Tecentriq today.  She appears to be    tolerating chemotherapy well without nausea or other specific side effects.      However she is fatigued and has lost a significant amount of weight.            CNS disease: Patient has small lesions in the brain which are likely metastatic.     When she completes chemotherapy will refer her back to radiation oncology for     evaluation and treatment.  She could have SRS versus WB RT.            BRIE: Patient likely has BRIE secondary to dehydration.  Her creatinine increased     from 0.93-1.33 today.  She is also lost 8.5 pounds.  She reports not taking in     as much to drink or eat.  She was seen and evaluated by nutrition today.  We als    o discussed other ways of increasing fluid intake and avoiding significant     amounts of caffeine which further dehydrate.  Patient will get 1 L of IV fluids     with normal saline in addition to her chemotherapy  today.  This will be repeated    for the next 2 days when she comes back for etoposide.            Anemia: Patient's hemoglobin decreased from 11.0 down to 9.0 after her first     cycle of chemotherapy.  So, her anemia is likely due to chemotherapy but could     be secondary to nutritional deficiencies or inflammation as well.  At her next     visit I will check iron studies, B12, and ferritin.            Patient Education      Patient Education Provided:  Yes            Electronically signed by MELCHOR ENCINAS  02/11/2020 17:05       Disclaimer: Converted document may not contain table formatting or lab diagrams. Please see [facility] for the authenticated document.

## 2021-05-28 NOTE — PROGRESS NOTES
Patient: ANGEL DE JESUS     [id number]     Report: #[id number]  UNIT #: [id number]     : 1945    Encounter Date:2020  PRIMARY CARE: Heydi Julian  ***Signed***  --------------------------------------------------------------------------------------------------------------------  NURSE INTAKE      Visit Type      Established Patient Visit            Chief Complaint      LUNG CA            Referring Provider/Copies To      Primary Care Provider:  SARABJIT GARCIA      Copies To:   SARABJIT GARCIA            History and Present Illness      Past Oncology Illness History      Ms. De Jesus is a 75 yo F with a new diagnosis of small cell lung cancer.  She was    a heavy smoker until last week when she came into the hospital with shortness of    breath. Initial chest x-ray was concerning for a mass.  She then had a CT chest     on 20 which showed a mass at least 5.1cm involving the right hilum, right     paratracheal and       subcarinal regions causing encasement and narrowing of the right main bronchus     and right middle lobe bronchus and occlusion of the right upper lobe bronchus     with subsequent complete atelectasis of the right upper and right middle lobes.     Of note, there is a large hiatal hernia with gastric volvulus but no evidence of    gastric outlet obstruction.  Biopsy on 1/3/20 was positive for small cell lung     cancer.              Brain MRI 1/10/20:         1. There is an apparent focus of small possible abnormal diffusion restriction     within the subcortical or deep white matter of the right frontal lobe.      Increased associated T2 and FLAIR signal are seen.  The findings may indicate a     small focus of subacute ischemic change.  Findings secondary to T2 shine through    could also have this appearance.  2. Evidence for small multifocal intracranial     metastatic foci along the inferior margin of the right frontal lobe as well as     the peripheral medial  posterior margin of the left cerebellum.  3. Nonspecific     white matter signal changes are observed likely due to chronic small vessel     ischemic changes and age related changes.  Associated diffuse volume loss is     noted.      MOHAMUD GARRETT MD       Electronically Signed and Approved By: MOHAMUD GARRETT MD    on 1/10/2020 at 8:18               PET/CT 1/13/20      CONCLUSION:         1. No significant change in size of a right perihilar/mediastinal mass with     hypermetabolic activity consistent with malignancy.  The mass obstructs the     right upper and right middle lobe bronchi with associated atelectasis, which is     also unchanged.      2. No evidence of metastatic disease outside the chest.      3. Chronic changes as described above.            Cycle 1 of carboplatin plus etoposide on 1/22/2020.  Added Tecentriq with cycle     2 on 2/4/2020.  Completed 4 cycles of chemotherapy before continuing Tecentriq     immunotherapy alone.            Completed whole brain radiation and consolidative right lung radiation on 4 /21/2020.            CT of the chest, abdomen, and pelvis on 6/25/2020: Significant improvement in     the right paratracheal and right hilar mass.  For example the right paratracheal    mass is 1.7 cm in greatest dimension and was previously 3.6 x 5.4 cm.  The right    upper lobe and right middle lobe are now normally aerated.  There are no new     pulmonary nodules or masses.  There is no metastatic disease in the abdomen     pelvis.  Patient does have incidental findings of emphysema and coronary     atherosclerosis.            Cycle 9 of Tecentriq maintenance on 9/22/2020.  The plan is to continue this     every 3 weeks until progression or intolerance.            Clinical Trial Participant      No            ECOG Performance Status      1            Most Recent Lab Findings      Laboratory Tests      9/22/20 09:44            PAST, FAMILY   Past Medical History      Past Medical  History:  COPD, High Cholesterol, Hypertension, Short of Air      Hematology/Oncology (F):  Lung Cancer            Past Surgical History      None            Family History      Family History:  No Family History            Social History      Number of Children:  3            Tobacco Use      Tobacco status:  Former smoker            Substance Use      Substance use:  Denies use            REVIEW OF SYSTEMS      General:  Denies: Appetite Change, Fatigue, Fever, Night Sweats, Weight Gain,     Weight Loss      Eye:  Admits Corrective Lenses; Denies Blurred Vision, Denies Diplopia, Denies     Vision Changes      ENT:  Denies Headache, Denies Hearing Loss, Denies Hoarseness, Denies Sore     Throat      Cardiovascular:  Denies Chest Pain, Denies Palpitations      Respiratory:  Denies: Cough, Coughing Blood, Productive Cough, Shortness of Air,    Wheezing      Gastrointestinal:  Denies Bloody Stools, Denies Constipation, Denies Diarrhea,     Denies Nausea/Vomiting, Denies Problem Swallowing, Denies Unable to Control     Bowels      Genitourinary:  Denies Blood in Urine, Denies Incontinence, Denies Painful     Urination      Musculoskeletal:  Denies Back Pain, Denies Muscle Pain, Denies Painful Joints      Integumentary:  Denies Itching, Denies Lesions, Denies Rash      Neurologic:  Denies Dizziness, Denies Numbness\Tingling, Denies Seizures      Psychiatric:  Denies Anxiety, Denies Depression      Endocrine:  Denies Cold Intolerance, Denies Heat Intolerance      Hematologic/Lymphatic:  Denies Bruising, Denies Bleeding, Denies Enlarged Lymph     Nodes      Reproductive:  Denies: Menopause, Heavy Periods, Pregnant, Still Menstruating            VITAL SIGNS AND SCORES      Vitals      Height 4 ft 11.49 in / 151.1 cm      Weight 117 lbs 11.610 oz / 53.4 kg      BSA 1.48 m2      BMI 23.4 kg/m2      Temperature 97.2 F / 36.22 C - Temporal      Pulse 70      Respirations 16      Blood Pressure 113/69 Sitting      Pulse  Oximetry 98%, RM AIR            Pain Score      Experiencing any pain?:  No      Pain Scale Used:  Numerical      Pain Intensity:  0            Fatigue Score      Experiencing any fatigue?:  No      Fatigue (0-10 scale):  0 (none)            EXAM      General: Alert, cooperative, no acute distress      Eyes: Anicteric sclera, PERRLA      Respiratory: CTAB, normal respiratory effort      Abdomen: Normal active bowel sounds, no tenderness, no distention      Cardiovascular: RRR, no murmur, no peripheral edema      Skin: Normal tone, no rash, no lesions      Psychiatric: Appropriate affect, intact judgment      Neurologic: No focal sensory or motor deficits, no weakness, numbness, dizziness      Musculoskeletal: Normal muscle strength, normal muscle tone      Extremities: No clubbing, cyanosis, or deformities            PREVENTION      Hx Influenza Vaccination:  Yes      Date Influenza Vaccine Given:  Sep 18, 2020      Influenza Vaccine Declined:  No      2 or More Falls in Past Year?:  No      Fall Past Year with Injury?:  No      Encouraged to follow-up with:  PCP regarding preventative exams.      Chart initiated by      SARA CARDOZA MA            ALLERGY/MEDS      Allergies      Coded Allergies:             NO KNOWN ALLERGIES (Unverified , 9/22/20)            Medications      Last Reconciled on 9/30/20 22:49 by MELCHOR ENCINAS      Prochlorperazine Maleate (Prochlorperazine Maleate) 10 Mg Tab      10 MG PO Q6H PRN for NAUSEA AND/OR VOMITING, #60 TAB 3 Refills         Prov: MELCHOR ENCINAS         1/13/20       Ondansetron Odt (ONDANSETRON ODT) 8 Mg Tab.rapdis      8 MG PO Q8H PRN for NAUSEA, #30 TAB.RAPDIS 3 Refills         Prov: MELCHOR ENCINAS         1/13/20       HYDROcodone-Acetaminophen 5-325 Mg (HYDROcodone-Acetaminophen 5-325 Mg) 1 Each     Tablet      1-2 TAB PO Q4H for Post Surgical PAin, #8 TAB 0 Refills         Prov: Rick Sheets         1/9/20       Fluticasone (Flovent HFA) 220 Mcg Inhaler      2 PUFF INH  RTBID, #1 INH 6 Refills         Prov: Tank Maya         1/7/20       amLODIPine (amLODIPine) 5 Mg Tablet      5 MG PO QDAY         Reported         1/2/20       Pravastatin Sodium (Pravastatin Sodium) 10 Mg Tablet      10 MG PO QDAY, TAB         Reported         7/23/15       Losartan/Hydrochlorothiazide (Losartan/Hctz 100/25 Mg) 1 Tab Tablet      1 TAB PO QDAY, #30 TAB 0 Refills         Reported         7/23/15       MDI-Albuterol (Proventil HFA) 60 Puffs/Inh Puffs      2 PUFFS INH Q4-6HPRN         Reported         3/1/13       Aspirin EC (Aspirin EC) 81 Mg Tabec      81 MG PO QDAY         Reported         3/1/13      Medications Reviewed:  No Changes made to meds            IMPRESSION/PLAN      Plan      Extensive stage small cell lung cancer: Patient completed 4 cycles of     carboplatin and etoposide with Tecentriq.  She is now here for maintenance thera    py with Tecentriq.  This is her ninth cycle and she is tolerating very well.      She had previous CNS disease and received whole brain radiation by Dr. Cheung.     Her most recent brain MRI showed no evidence of disease.  Her next PET scan is     planned for November 16.  She will now follow-up with me every other visit.            Patient Education      Patient Education Provided:  Yes            Electronically signed by MELCHOR ENCINAS  09/30/2020 22:50       Disclaimer: Converted document may not contain table formatting or lab diagrams. Please see [facility] for the authenticated document.

## 2021-05-28 NOTE — PROGRESS NOTES
Patient: ANGEL DE JESUS     [id number]     Report: #[id number]  UNIT #: [id number]     : 1945    Encounter Date:2020  PRIMARY CARE: Heydi Julian  ***Signed***  --------------------------------------------------------------------------------------------------------------------  NURSE INTAKE      Visit Type      Established Patient Visit            Chief Complaint      lung ca            History and Present Illness      Past Oncology Illness History      Ms. De Jesus is a 73 yo F with a new diagnosis of small cell lung cancer.  She was    a heavy smoker until last week when she came into the hospital with shortness of    breath. Initial chest x-ray was concerning for a mass.  She then had a CT chest     on 20 which showed a mass at least 5.1cm involving the right hilum, right     paratracheal and       subcarinal regions causing encasement and narrowing of the right main bronchus     and right middle lobe bronchus and occlusion of the right upper lobe bronchus     with subsequent complete atelectasis of the right upper and right middle lobes.     Of note, there is a large hiatal hernia with gastric volvulus but no evidence of    gastric outlet obstruction.  Biopsy on 1/3/20 was positive for small cell lung     cancer.              Brain MRI 1/10/20:         1. There is an apparent focus of small possible abnormal diffusion restriction     within the subcortical or deep white matter of the right frontal lobe.      Increased associated T2 and FLAIR signal are seen.  The findings may indicate a     small focus of subacute ischemic change.  Findings secondary to T2 shine through    could also have this appearance.  2. Evidence for small multifocal intracranial     metastatic foci along the inferior margin of the right frontal lobe as well as     the peripheral medial posterior margin of the left cerebellum.  3. Nonspecific     white matter signal changes are observed likely due to chronic  small vessel     ischemic changes and age related changes.  Associated diffuse volume loss is     noted.      MOHAMUD GARRETT MD       Electronically Signed and Approved By: MOHAMUD GARRETT MD    on 1/10/2020 at 8:18               PET/CT 1/13/20      CONCLUSION:         1. No significant change in size of a right perihilar/mediastinal mass with     hypermetabolic activity consistent with malignancy.  The mass obstructs the righ    t upper and right middle lobe bronchi with associated atelectasis, which is also    unchanged.      2. No evidence of metastatic disease outside the chest.      3. Chronic changes as described above.            Cycle 1 of carboplatin plus etoposide on 1/22/2020.  Added Tecentriq with cycle     2 on 2/4/2020.  Completed 4 cycles of chemotherapy before continuing Tecentriq     immunotherapy alone.            Cycle 8th cycle of Tecentriq alone on 9/1/20.  The plan is to continue this     every 3 weeks until progression or intolerance.            Completed whole brain radiation and consolidative right lung radiation on     4/21/2020.            CT of the chest, abdomen, and pelvis on 6/25/2020: Significant improvement in     the right paratracheal and right hilar mass.  For example the right paratracheal    mass is 1.7 cm in greatest dimension and was previously 3.6 x 5.4 cm.  The right    upper lobe and right middle lobe are now normally aerated.  There are no new pul    monary nodules or masses.  There is no metastatic disease in the abdomen pelvis.     Patient does have incidental findings of emphysema and coronary     atherosclerosis.            HPI - Oncology Interim      Patient comes in for her eighth cycle of Tecentriq.  She is doing very well and     has no complaints today.            Clinical Trial Participant      No            ECOG Performance Status      1            Most Recent Lab Findings      Laboratory Tests      9/1/20 09:33            PAST, FAMILY   Past Medical History       Past Medical History:  COPD, High Cholesterol, Hypertension, Short of Air      Hematology/Oncology (F):  Lung Cancer            Past Surgical History      None            Family History      Family History:  No Family History            Social History      Number of Children:  3            Tobacco Use      Tobacco status:  Former smoker            Substance Use      Substance use:  Denies use            REVIEW OF SYSTEMS      General:  Admits: Fatigue;          Denies: Appetite Change, Fever, Night Sweats, Weight Gain, Weight Loss      Eye:  Denies Blurred Vision, Denies Corrective Lenses, Denies Diplopia, Denies     Vision Changes      ENT:  Denies Headache, Denies Hearing Loss, Denies Hoarseness, Denies Sore     Throat      Cardiovascular:  Denies Chest Pain, Denies Palpitations      Respiratory:  Admits: Shortness of Air;          Denies: Cough, Coughing Blood, Productive Cough, Wheezing      Gastrointestinal:  Denies Bloody Stools, Denies Constipation, Denies Diarrhea,     Denies Nausea/Vomiting, Denies Problem Swallowing, Denies Unable to Control     Bowels      Genitourinary:  Denies Blood in Urine, Denies Incontinence, Denies Painful     Urination      Musculoskeletal:  Denies Back Pain, Denies Muscle Pain, Denies Painful Joints      Integumentary:  Denies Itching, Denies Lesions, Denies Rash      Neurologic:  Denies Dizziness, Denies Numbness\Tingling, Denies Seizures      Psychiatric:  Denies Anxiety, Denies Depression      Endocrine:  Denies Cold Intolerance, Denies Heat Intolerance      Hematologic/Lymphatic:  Denies Bruising, Denies Bleeding, Denies Enlarged Lymph     Nodes      Reproductive:  Denies: Menopause, Heavy Periods, Pregnant, Still Menstruating            VITAL SIGNS AND SCORES      Vitals      Height 4 ft 11.49 in / 151.1 cm      Weight 118 lbs 13.247 oz / 53.9 kg      BSA 1.49 m2      BMI 23.6 kg/m2      Temperature 97.1 F / 36.17 C - Temporal      Pulse 73      Respirations 16      Blood  Pressure 113/53 Sitting      Pulse Oximetry 98%, RM AIR            Pain Score      Experiencing any pain?:  No      Pain Scale Used:  Numerical      Pain Intensity:  0            Fatigue Score      Experiencing any fatigue?:  Yes      Fatigue (0-10 scale):  4            EXAM      General: Alert, cooperative, no acute distress      Eyes: Anicteric sclera, PERRLA      HEENT: Oropharynx clear, no exudates      Respiratory: CTAB, normal respiratory effort      Abdomen: Normal active bowel sounds, no tenderness, no distention      Cardiovascular: RRR, no murmur, no peripheral edema      Skin: Normal tone, no rash, no lesions      Psychiatric: Appropriate affect, intact judgment      Neurologic: No focal sensory or motor deficits, no weakness, numbness, dizziness      Musculoskeletal: Normal muscle strength, normal muscle tone      Extremities: No clubbing, cyanosis, or deformities            PREVENTION      Hx Influenza Vaccination:  Yes      Date Influenza Vaccine Given:  Oct 1, 2018      Influenza Vaccine Declined:  No      2 or More Falls in Past Year?:  No      Fall Past Year with Injury?:  No      Encouraged to follow-up with:  PCP regarding preventative exams.      Chart initiated by      robson gallegos ma            ALLERGY/MEDS      Allergies      Coded Allergies:             NO KNOWN ALLERGIES (Unverified , 9/1/20)            Medications      Last Reconciled on 9/8/20 23:13 by MELCHOR ENCINAS      Prochlorperazine Maleate (Prochlorperazine Maleate) 10 Mg Tab      10 MG PO Q6H PRN for NAUSEA AND/OR VOMITING, #60 TAB 3 Refills         Prov: MELCHOR ENCINAS         1/13/20       Ondansetron Odt (ONDANSETRON ODT) 8 Mg Tab.rapdis      8 MG PO Q8H PRN for NAUSEA, #30 TAB.RAPDIS 3 Refills         Prov: MELCHOR ENCINAS         1/13/20       HYDROcodone-Acetaminophen 5-325 Mg (HYDROcodone-Acetaminophen 5-325 Mg) 1 Each     Tablet      1-2 TAB PO Q4H for Post Surgical PAin, #8 TAB 0 Refills         Prov: Rick Sheets          1/9/20       Fluticasone (Flovent HFA) 220 Mcg Inhaler      2 PUFF INH RTBID, #1 INH 6 Refills         Prov: Tank Maya         1/7/20       amLODIPine (amLODIPine) 5 Mg Tablet      5 MG PO QDAY         Reported         1/2/20       Pravastatin Sodium (Pravastatin Sodium) 10 Mg Tablet      10 MG PO QDAY, TAB         Reported         7/23/15       Losartan/Hydrochlorothiazide (Losartan/Hctz 100/25 Mg) 1 Tab Tablet      1 TAB PO QDAY, #30 TAB 0 Refills         Reported         7/23/15       MDI-Albuterol (Proventil HFA) 60 Puffs/Inh Puffs      2 PUFFS INH Q4-6HPRN         Reported         3/1/13       Aspirin EC (Aspirin EC) 81 Mg Tabec      81 MG PO QDAY         Reported         3/1/13      Medications Reviewed:  No Changes made to meds            IMPRESSION/PLAN      Impression      75-year-old female with extensive stage small cell lung cancer here today for     her eighth cycle of Tecentriq.            Plan      Extensive stage small cell lung cancer: Patient completed 4 cycles of     carboplatin and episode with Tecentriq.  She is now coming in for pain.  She is     here today for her eighth cycle and tolerating it very well with no acute     plaints.  Previous CNS disease with whole brain radiation with Dr. Florez.  Her     most recent brain MRI was on 8/4/2020 showed no disease progression.  She has     follow-up with radiation and allergy on 923 and a repeat brain MRI scheduled for    10/9/2010.  She will now follow-up with me every other cycle.  ET scan is     planned for 11/16/2020.            Patient Education      Patient Education Provided:  Yes            Electronically signed by MELCHOR ENCINAS  09/08/2020 23:13       Disclaimer: Converted document may not contain table formatting or lab diagrams. Please see [employer] for the authenticated document.

## 2021-05-28 NOTE — PROGRESS NOTES
Patient: ANGEL DE JESUS     Acct: [id number]     Report: #[id number]  UNIT #: [id number]     : 1945    Encounter Date:2020  PRIMARY CARE: Heydi Julian  ***Signed***  --------------------------------------------------------------------------------------------------------------------  NURSE INTAKE      Visit Type      Established Patient Visit            Chief Complaint      SCLC            Referring Provider/Copies To      [facility] [place]      Copies To:   Heydi Julian            History and Present Illness      Past Oncology Illness History      Ms. De Jesus is a 75 yo F with a new diagnosis of small cell lung cancer.  She was    a heavy smoker until last week when she came into the hospital with shortness of    breath. Initial chest x-ray was concerning for a mass.  She then had a CT chest     on 20 which showed a mass at least 5.1cm involving the right hilum, right     paratracheal and       subcarinal regions causing encasement and narrowing of the right main bronchus     and right middle lobe bronchus and occlusion of the right upper lobe bronchus     with subsequent complete atelectasis of the right upper and right middle lobes.     Of note, there is a large hiatal hernia with gastric volvulus but no evidence of    gastric outlet obstruction.  Biopsy on 1/3/20 was positive for small cell lung     cancer.              Brain MRI 1/10/20:         1. There is an apparent focus of small possible abnormal diffusion restriction     within the subcortical or deep white matter of the right frontal lobe.      Increased associated T2 and FLAIR signal are seen.  The findings may indicate a     small focus of subacute ischemic change.  Findings secondary to T2 shine through    could also have this appearance.  2. Evidence for small multifocal intracranial     metastatic foci along the inferior margin of the right frontal lobe as well as     the peripheral  medial posterior margin of the left cerebellum.  3. Nonspecific     white matter signal changes are observed likely due to chronic small vessel     ischemic changes and age related changes.  Associated diffuse volume loss is     noted.      MOHAMUD GARRETT MD       Electronically Signed and Approved By: MOHAMUD GARRETT MD    on 1/10/2020 at 8:18               PET/CT 1/13/20      CONCLUSION:         1. No significant change in size of a right perihilar/mediastinal mass with     hypermetabolic activity consistent with malignancy.  The mass obstructs the     right upper and right middle lobe bronchi with associated atelectasis, which is     also unchanged.      2. No evidence of metastatic disease outside the chest.      3. Chronic changes as described above.            Cycle 1 of carboplatin plus etoposide on 1/22/2020      Cycle 2 of carboplatin plus etoposide with Tecentriq on 2/4/2020      Cycle 3 of carboplatin plus etoposide with Tecentriq on 2/25/2020            HPI - Oncology Interim      Patient's labs reviewed today and are adequate for her to proceed with     chemotherapy.  WBC count 18.63, hemoglobin 9.0, platelet count 759, MCV 89.      Patient's only complaints are that she has occasional shakes for no specific     reason but does admit that could be related to generalized weakness.  She denies    significant nausea and still has a good appetite.  Her weight is stable.  She     does complain of abdominal pain in the days after Neulasta but otherwise feels     well.            Clinical Trial Participant      No            ECOG Performance Status      1            Most Recent Lab Findings      Laboratory Tests      2/25/20 08:40            PAST, FAMILY   Past Medical History      Past Medical History:  COPD, High Cholesterol, Hypertension, Short of Air      Hematology/Oncology (F):  Lung Cancer            Past Surgical History      None            Family History      Family History:  No Family History             Social History      Marital Status:        Number of Children:  3            Tobacco Use      Tobacco status:  Former smoker      Smoking history:  25-50 pack years            Alcohol Use      Alcohol intake:  0-2 drinks per day            Substance Use      Substance use:  Denies use            REVIEW OF SYSTEMS      General:  Denies: Appetite Change, Fatigue, Fever, Night Sweats, Weight Gain,     Weight Loss      Eye:  Denies Blurred Vision, Denies Corrective Lenses, Denies Diplopia, Denies     Vision Changes      ENT:  Denies Headache, Denies Hearing Loss, Denies Hoarseness, Denies Sore     Throat      Cardiovascular:  Denies Chest Pain, Denies Palpitations      Respiratory:  Denies: Coughing Blood, Productive Cough, Shortness of Air,     Wheezing      Gastrointestinal:  Denies Bloody Stools, Denies Constipation, Denies Diarrhea,     Denies Nausea/Vomiting, Denies Problem Swallowing, Denies Unable to Control     Bowels      Genitourinary:  Denies Blood in Urine, Denies Incontinence, Denies Painful     Urination      Musculoskeletal:  Denies Back Pain, Denies Muscle Pain, Denies Painful Joints      Integumentary:  Denies Itching, Denies Lesions, Denies Rash      Neurologic:  Denies Dizziness, Denies Numbness\Tingling, Denies Seizures      Psychiatric:  Denies Anxiety, Denies Depression      Endocrine:  Denies Cold Intolerance, Denies Heat Intolerance      Hematologic/Lymphatic:  Denies Bruising, Denies Bleeding, Denies Enlarged Lymph     Nodes      Reproductive:  Denies: Menopause, Heavy Periods, Pregnant, Still Menstruating            VITAL SIGNS AND SCORES      Vitals      Weight 137 lbs 9.073 oz / 62.4 kg      Temperature 96.9 F / 36.06 C - Temporal      Pulse 85      Respirations 18      Blood Pressure 121/54 Sitting      Pulse Oximetry 98%, ROOM AIR            Pain Score      Experiencing any pain?:  No      Pain Scale Used:  Numerical      Pain Intensity:  0            Fatigue Score      Experiencing  any fatigue?:  No      Fatigue (0-10 scale):  0 (none)            EXAM      General: Alert, cooperative, no acute distress      Eyes: Anicteric sclera, PERRLA      HEENT: Oropharynx clear, no exudates      Respiratory: CTAB, normal respiratory effort      Abdomen: Normal active bowel sounds, no tenderness, no distention      Cardiovascular: RRR, no murmur, no peripheral edema      Skin: Normal tone, no rash, no lesions      Psychiatric: Appropriate affect, intact judgment      Neurologic: No focal sensory or motor deficits, no weakness, numbness, dizziness      Musculoskeletal: Normal muscle strength, normal muscle tone      Extremities: No clubbing, cyanosis, or deformities            PREVENTION      Hx Influenza Vaccination:  No      Date Influenza Vaccine Given:  Oct 1, 2018      Influenza Vaccine Declined:  No      2 or More Falls Past Year?:  No      Fall Past Year with Injury?:  No      Hx Pneumococcal Vaccination:  Yes      Encouraged to follow-up with:  PCP regarding preventative exams.      Chart initiated by      CHER AMADOR [facility]            ALLERGY/MEDS      Allergies      Coded Allergies:             NO KNOWN ALLERGIES (Unverified , 2/25/20)            Medications      Last Reconciled on 3/5/20 00:01 by MELCHOR ENCINAS      Prochlorperazine Maleate (Prochlorperazine Maleate) 10 Mg Tab      10 MG PO Q6H PRN for NAUSEA AND/OR VOMITING, #60 TAB 3 Refills         Prov: MELCHOR ENCINAS         1/13/20       Ondansetron Odt (ONDANSETRON ODT) 8 Mg Tab.rapdis      8 MG PO Q8H PRN for NAUSEA, #30 TAB.RAPDIS 3 Refills         Prov: MELCHOR ENCINAS         1/13/20       HYDROcodone-Acetaminophen 5-325 Mg (HYDROcodone-Acetaminophen 5-325 Mg) 1 Each     Tablet      1-2 TAB PO Q4H for Post Surgical PAin, #8 TAB 0 Refills         Prov: Rick Sheets         1/9/20       predniSONE (predniSONE) 20 Mg Tablet      40 MG PO QDAY, TAB 0 Refills         Reported         1/8/20       Fluticasone (Flovent HFA) 220 Mcg Inhaler       2 PUFF INH RTBID, #1 INH 6 Refills         Prov: Tank Maya         1/7/20       amLODIPine (amLODIPine) 5 Mg Tablet      5 MG PO QDAY         Reported         1/2/20       Pravastatin Sodium (Pravastatin Sodium) 10 Mg Tablet      10 MG PO QDAY, TAB         Reported         7/23/15       Losartan/Hydrochlorothiazide (Losartan/Hctz 100/25 Mg) 1 Tab Tablet      1 TAB PO QDAY, #30 TAB 0 Refills         Reported         7/23/15       MDI-Albuterol (Proventil HFA) 60 Puffs/Inh Puffs      2 PUFFS INH Q4-6HPRN         Reported         3/1/13       Omega-3 Fatty Acids/Fish Oil (Fish Oil 1000 Mg) 1,000 Mg Capsule      1000 MG PO QDAY, CAP         Reported         3/1/13       Aspirin EC (Aspirin EC) 81 Mg Tabec      81 MG PO QDAY         Reported         3/1/13      Medications Reviewed:  No Changes made to meds            IMPRESSION/PLAN      Impression      74-year-old female with extensive stage small cell lung cancer: Patient is here     today for cycle 3 of carboplatin plus etoposide with Tecentriq.  This will be     her second dose of Tecentriq.  She is tolerating chemotherapy and immunotherapy     well without any specific complaints.  She return to clinic in 3 weeks for cycle    4.            CNS disease: Patient has small lesions in the brain which are likely metastatic.     When she completes chemotherapy however her back to radiation oncologist for     evaluation and possible treatment with SRS versus whole brain radiation therapy.            Thrombocytosis: Patient's platelet count was elevated to 759 and has been e    levated for some time.  This is likely secondary to reactive process but iron     deficiency anemia could also play a role.  I will check iron studies as well as     B12 and folate.              Leukocytosis: Her white blood cell count is also elevated to 18.63 but this is     secondary to Neulasta.            BRIE: Patient's creatinine is mildly elevated at 1.08.  I will give her an      additional 500 cc bolus on days 1 through 3.            Diagnosis      Anemia - D64.9            Notes      New Diagnostics      * Iron Profile, Routine         Dx: Anemia - D64.9      * Ferritin Level, Routine         Dx: Anemia - D64.9            Patient Education      Patient Education Provided:  Yes            Electronically signed by MELCHOR ENCINAS  03/05/2020 00:01       Disclaimer: Converted document may not contain table formatting or lab diagrams. Please see [employer] for the authenticated document.

## 2021-05-28 NOTE — PROGRESS NOTES
Patient: ANGEL DE JESUS     [id number]     Report: #[id number]  UNIT #: [id number]     : 1945    Encounter Date:12/15/2020  PRIMARY CARE: Heydi Julian  ***Signed***  --------------------------------------------------------------------------------------------------------------------  NURSE INTAKE      Visit Type      Established Patient Visit            Chief Complaint      SCLC            Referring Provider/Copies To      Primary Care Provider:  SARABJIT GARCIA      Copies To:   SARABJIT GARCIA            History and Present Illness      Past Oncology Illness History      Ms. De Jesus is a 76 yo F with a new diagnosis of small cell lung cancer.  She was    a heavy smoker until last week when she came into the hospital with shortness of    breath. Initial chest x-ray was concerning for a mass.  She then had a CT chest     on 20 which showed a mass at least 5.1cm involving the right hilum, right     paratracheal and       subcarinal regions causing encasement and narrowing of the right main bronchus     and right middle lobe bronchus and occlusion of the right upper lobe bronchus     with subsequent complete atelectasis of the right upper and right middle lobes.     Of note, there is a large hiatal hernia with gastric volvulus but no evidence of    gastric outlet obstruction.  Biopsy on 1/3/20 was positive for small cell lung     cancer.              Brain MRI 1/10/20:         1. There is an apparent focus of small possible abnormal diffusion restriction     within the subcortical or deep white matter of the right frontal lobe.      Increased associated T2 and FLAIR signal are seen.  The findings may indicate a     small focus of subacute ischemic change.  Findings secondary to T2 shine through    could also have this appearance.  2. Evidence for small multifocal intracranial     metastatic foci along the inferior margin of the right frontal lobe as well as     the peripheral medial  posterior margin of the left cerebellum.  3. Nonspecific     white matter signal changes are observed likely due to chronic small vessel     ischemic changes and age related changes.  Associated diffuse volume loss is     noted.      MOHAMUD GARRETT MD       Electronically Signed and Approved By: MOHAMUD GARRETT MD    on 1/10/2020 at 8:18               PET/CT 1/13/20      CONCLUSION:         1. No significant change in size of a right perihilar/mediastinal mass with     hypermetabolic activity consistent with malignancy.  The mass obstructs the     right upper and right middle lobe bronchi with associated atelectasis, which is     also unchanged.      2. No evidence of metastatic disease outside the chest.      3. Chronic changes as described above.            Cycle 1 of carboplatin plus etoposide on 1/22/2020.  Added Tecentriq with cycle     2 on 2/4/2020.  Completed 4 cycles of chemotherapy before continuing Tecentriq     immunotherapy alone.            Completed whole brain radiation and consolidative right lung radiation on     4/21/2020.            CT of the chest, abdomen, and pelvis on 6/25/2020: Significant improvement in th    e right paratracheal and right hilar mass.  For example the right paratracheal     mass is 1.7 cm in greatest dimension and was previously 3.6 x 5.4 cm.  The right    upper lobe and right middle lobe are now normally aerated.  There are no new     pulmonary nodules or masses.  There is no metastatic disease in the abdomen     pelvis.  Patient does have incidental findings of emphysema and coronary     atherosclerosis.            On Tecentriq maintenance every 3 weeks until progression or intolerance.             PET/CT on 12/7/2020: Overall the scan shows further improvement with resolution     of the previous right hilar and mediastinal masses.  There are a few areas of     focus around the L4 vertebral body and left femoral head which we will continue     2 monitor.  The patient is  currently asymptomatic.            HPI - Oncology Interim      Patient comes in today for her next cycle of Tecentriq.  I reviewed the results     of the recent PET scan with her which were essentially negative.  The patient     was very pleased with his result and continues a state that she has no symptoms     or side effects.  She says that she has gained a couple of pounds since last     time I saw her.            Clinical Trial Participant      No            ECOG Performance Status      1            PAST, FAMILY   Past Medical History      Past Medical History:  COPD, High Cholesterol, Hypertension, Short of Air      Hematology/Oncology (F):  Lung Cancer            Past Surgical History      None            Family History      Family History:  No Family History            Social History      Number of Children:  3            Tobacco Use      Tobacco status:  Former smoker            Substance Use      Substance use:  Denies use            REVIEW OF SYSTEMS      General:  Denies: Appetite Change, Fatigue, Fever, Night Sweats, Weight Gain,     Weight Loss      Eye:  Denies Blurred Vision, Denies Corrective Lenses, Denies Diplopia, Denies     Vision Changes      ENT:  Denies Headache, Denies Hearing Loss, Denies Hoarseness, Denies Sore     Throat      Cardiovascular:  Denies Chest Pain, Denies Palpitations      Respiratory:  Admits: Shortness of Air;          Denies: Cough, Coughing Blood, Productive Cough, Wheezing      Gastrointestinal:  Denies Bloody Stools, Denies Constipation, Denies Diarrhea,     Denies Nausea/Vomiting, Denies Problem Swallowing, Denies Unable to Control     Bowels      Genitourinary:  Denies Blood in Urine, Denies Incontinence, Denies Painful     Urination      Musculoskeletal:  Denies Back Pain, Denies Muscle Pain, Denies Painful Joints      Integumentary:  Denies Itching, Denies Lesions, Denies Rash      Neurologic:  Denies Dizziness, Denies Numbness\Tingling, Denies Seizures       Psychiatric:  Denies Anxiety, Denies Depression      Endocrine:  Denies Cold Intolerance, Denies Heat Intolerance      Hematologic/Lymphatic:  Denies Bruising, Denies Bleeding, Denies Enlarged Lymph     Nodes      Reproductive:  Denies: Menopause, Heavy Periods, Pregnant, Still Menstruating            VITAL SIGNS AND SCORES      Vitals      Weight 120 lbs 5.939 oz / 54.6 kg      Temperature 97.8 F / 36.56 C - Temporal      Pulse 88      Respirations 18      Blood Pressure 132/69 Sitting      Pulse Oximetry 99%, RM AIR            Pain Score      Experiencing any pain?:  No      Pain Scale Used:  Numerical      Pain Intensity:  0            Fatigue Score      Experiencing any fatigue?:  No      Fatigue (0-10 scale):  0 (none)            EXAM      General: Alert, cooperative, no acute distress      Eyes: Anicteric sclera, PERRLA      Respiratory: CTAB, normal respiratory effort      Abdomen: Normal active bowel sounds, no tenderness, no distention      Cardiovascular: RRR, no murmur, no peripheral edema      Skin: Normal tone, no rash, no lesions      Psychiatric: Appropriate affect, intact judgment      Neurologic: No focal sensory or motor deficits, no weakness, numbness, dizziness      Musculoskeletal: Normal muscle strength, normal muscle tone      Extremities: No clubbing, cyanosis, or deformities            PREVENTION      Hx Influenza Vaccination:  Yes      Date Influenza Vaccine Given:  Sep 18, 2020      Influenza Vaccine Declined:  No      2 or More Falls in Past Year?:  No      Fall Past Year with Injury?:  No      Hx Pneumococcal Vaccination:  Yes      Encouraged to follow-up with:  PCP regarding preventative exams.      Chart initiated by      SARA CARDOZA MA            ALLERGY/MEDS      Allergies      Coded Allergies:             NO KNOWN ALLERGIES (Unverified , 12/15/20)            Medications      Last Reconciled on 12/15/20 17:40 by MELCHOR ENCINAS      Prochlorperazine Maleate (Prochlorperazine Maleate)  10 Mg Tab      10 MG PO Q6H PRN for NAUSEA AND/OR VOMITING, #60 TAB 3 Refills         Prov: MELCHOR ENCINAS         1/13/20       Ondansetron Odt (ONDANSETRON ODT) 8 Mg Tab.rapdis      8 MG PO Q8H PRN for NAUSEA, #30 TAB.RAPDIS 3 Refills         Prov: MELCHOR ENCINAS         1/13/20       HYDROcodone-Acetaminophen 5-325 Mg (HYDROcodone-Acetaminophen 5-325 Mg) 1 Each     Tablet      1-2 TAB PO Q4H for Post Surgical PAin, #8 TAB 0 Refills         Prov: Rick Sheets         1/9/20       Fluticasone (Flovent HFA) 220 Mcg Inhaler      2 PUFF INH RTBID, #1 INH 6 Refills         Prov: Tank Maya         1/7/20       amLODIPine (amLODIPine) 5 Mg Tablet      5 MG PO QDAY         Reported         1/2/20       Pravastatin Sodium (Pravastatin Sodium) 10 Mg Tablet      10 MG PO QDAY, TAB         Reported         7/23/15       Losartan/Hydrochlorothiazide (Losartan/Hctz 100/25 Mg) 1 Tab Tablet      1 TAB PO QDAY, #30 TAB 0 Refills         Reported         7/23/15       MDI-Albuterol (Proventil HFA) 60 Puffs/Inh Puffs      2 PUFFS INH Q4-6HPRN         Reported         3/1/13       Aspirin EC (Aspirin EC) 81 Mg Tabec      81 MG PO QDAY         Reported         3/1/13      Medications Reviewed:  No Changes made to meds            IMPRESSION/PLAN      Plan      Extensive stage small cell lung cancer: Patient completed 4 cycles of     carboplatin and etoposide with Tecentriq.  She is here today for continued     maintenance therapy with Tecentriq.  She is tolerating it very well and is     gaining weight.  Recent PET scan shows no evidence of metastatic or recurrent     disease.  The patient is currently on the every 4 week dose.  She will not need     a see me with the next cycle in 4 weeks but can see me again in 8 weeks.            CNS metastasis: Patient underwent whole brain radiation therapy by Dr. Alvarez.      Most recent brain MRI on 11/16/2020 was negative.            Patient Education      Patient Education Provided:  Yes             Electronically signed by MELCHOR ENCINAS  12/15/2020 17:40       Disclaimer: Converted document may not contain table formatting or lab diagrams. Please see [employer] for the authenticated document.

## 2021-05-28 NOTE — PROGRESS NOTES
Patient: ANGEL DE JESUS     Acct: [id number]     Report: #[id number]  UNIT #: [id number]     : 1945    Encounter Date:2020  PRIMARY CARE: Heydi Julian  ***Signed***  --------------------------------------------------------------------------------------------------------------------  NURSE INTAKE      Visit Type      Established Patient Visit            Chief Complaint      SCLC HERE FOR TX            Referring Provider/Copies To      Primary Care Provider:  [facility]            History and Present Illness      Past Oncology Illness History      Ms. De Jesus is a 73 yo F with a new diagnosis of small cell lung cancer.  She was    a heavy smoker until last week when she came into the hospital with shortness of    breath. Initial chest x-ray was concerning for a mass.  She then had a CT chest     on 20 which showed a mass at least 5.1cm involving the right hilum, right     paratracheal and       subcarinal regions causing encasement and narrowing of the right main bronchus     and right middle lobe bronchus and occlusion of the right upper lobe bronchus     with subsequent complete atelectasis of the right upper and right middle lobes.     Of note, there is a large hiatal hernia with gastric volvulus but no evidence of    gastric outlet obstruction.  Biopsy on 1/3/20 was positive for small cell lung     cancer.              Brain MRI 1/10/20:         1. There is an apparent focus of small possible abnormal diffusion restriction     within the subcortical or deep white matter of the right frontal lobe.      Increased associated T2 and FLAIR signal are seen.  The findings may indicate a     small focus of subacute ischemic change.  Findings secondary to T2 shine through    could also have this appearance.  2. Evidence for small multifocal intracranial     metastatic foci along the inferior margin of the right frontal lobe as well as     the peripheral medial posterior margin of  the left cerebellum.  3. Nonspecific     white matter signal changes are observed likely due to chronic small vessel     ischemic changes and age related changes.  Associated diffuse volume loss is     noted.      MOHAMUD GARRETT MD       Electronically Signed and Approved By: MOHAMUD GARRETT MD    on 1/10/2020 at 8:18               PET/CT 1/13/20      CONCLUSION:         1. No significant change in size of a right perihilar/mediastinal mass with     hypermetabolic activity consistent with malignancy.  The mass obstructs the     right upper and right middle lobe bronchi with associated atelectasis, which is     also unchanged.      2. No evidence of metastatic disease outside the chest.      3. Chronic changes as described above.            Cycle 1 of carboplatin plus etoposide on 1/22/2020      Cycle 2 of carboplatin plus etoposide with Tecentriq on 2/4/2020      Cycle 3 of carboplatin plus etoposide with Tecentriq on 2/25/2020      Cycle 4 of carboplatin plus etoposide with Tecentriq on 3/17/2020      Cycle 1 of Tecentriq alone on 4/7/2020, cycle 2 on 4/28/2020.            HPI - Oncology Interim      Patient comes in today prior to cycle 2 of Tecentriq alone.  She states that she    feels great.  Her appetite is getting better since stopping the chemotherapy.      She is able to do chores and work outside.            Clinical Trial Participant      No            ECOG Performance Status      1            Most Recent Lab Findings      Laboratory Tests      4/28/20 10:46            PAST, FAMILY   Past Medical History      Past Medical History:  COPD, High Cholesterol, Hypertension, Short of Air      Hematology/Oncology (F):  Lung Cancer            Past Surgical History      None            Family History      Family History:  No Family History            Social History      Marital Status:        Number of Children:  3            Tobacco Use      Tobacco status:  Former smoker      Smoking history:  25-50 pack  years            Alcohol Use      Alcohol intake:  0-2 drinks per day            Substance Use      Substance use:  Denies use            REVIEW OF SYSTEMS      General:  Admits: Fatigue;          Denies: Appetite Change, Fever, Night Sweats, Weight Gain, Weight Loss      Eye:  Denies Blurred Vision, Denies Corrective Lenses, Denies Diplopia, Denies     Vision Changes      ENT:  Denies Headache, Denies Hearing Loss, Denies Hoarseness, Denies Sore     Throat      Cardiovascular:  Denies Chest Pain, Denies Palpitations      Respiratory:  Denies: Cough, Coughing Blood, Productive Cough, Shortness of Air,    Wheezing      Gastrointestinal:  Denies Bloody Stools, Denies Constipation, Denies Diarrhea,     Denies Nausea/Vomiting, Denies Problem Swallowing, Denies Unable to Control     Bowels      Genitourinary:  Denies Blood in Urine, Denies Incontinence, Denies Painful     Urination      Musculoskeletal:  Denies Back Pain, Denies Muscle Pain, Denies Painful Joints      Integumentary:  Denies Itching, Denies Lesions, Denies Rash      Neurologic:  Denies Dizziness, Denies Numbness\Tingling, Denies Seizures      Psychiatric:  Denies Anxiety, Denies Depression      Endocrine:  Denies Cold Intolerance, Denies Heat Intolerance      Hematologic/Lymphatic:  Denies Bruising, Denies Bleeding, Denies Enlarged Lymph     Nodes      Reproductive:  Denies: Menopause, Heavy Periods, Pregnant, Still Menstruating            VITAL SIGNS AND SCORES      Vitals      Height 4 ft 11.49 in / 151.1 cm      Weight 126 lbs 8.705 oz / 57.4 kg      BSA 1.53 m2      BMI 25.1 kg/m2      Temperature 97.9 F / 36.61 C - Temporal      Pulse 109      Respirations 17      Blood Pressure 110/58 Sitting      Pulse Oximetry 98%, ROOM AIR            Pain Score      Experiencing any pain?:  No      Pain Scale Used:  Numerical      Pain Intensity:  0            Fatigue Score      Experiencing any fatigue?:  Yes      Fatigue (0-10 scale):  4            EXAM       General: Alert, cooperative, no acute distress      Eyes: Anicteric sclera, PERRLA      HEENT: Oropharynx clear, no exudates      Respiratory: CTAB, normal respiratory effort      Abdomen: Normal active bowel sounds, no tenderness, no distention      Cardiovascular: RRR, no murmur, no peripheral edema      Skin: Normal tone, no rash, no lesions      Psychiatric: Appropriate affect, intact judgment      Neurologic: No focal sensory or motor deficits, no weakness, numbness, dizziness      Musculoskeletal: Normal muscle strength, normal muscle tone      Extremities: No clubbing, cyanosis, or deformities            PREVENTION      Hx Influenza Vaccination:  Yes      Date Influenza Vaccine Given:  Oct 1, 2018      Influenza Vaccine Declined:  No      2 or More Falls Past Year?:  No      Fall Past Year with Injury?:  No      Hx Pneumococcal Vaccination:  Yes      Encouraged to follow-up with:  PCP regarding preventative exams.      Chart initiated by      HÉCTOR COYNE            ALLERGY/MEDS      Allergies      Coded Allergies:             NO KNOWN ALLERGIES (Unverified , 4/7/20)            Medications      Last Reconciled on 5/5/20 17:05 by MELCHOR ENCINAS      Prochlorperazine Maleate (Prochlorperazine Maleate) 10 Mg Tab      10 MG PO Q6H PRN for NAUSEA AND/OR VOMITING, #60 TAB 3 Refills         Prov: MELCHOR ENCINAS         1/13/20       Ondansetron Odt (ONDANSETRON ODT) 8 Mg Tab.rapdis      8 MG PO Q8H PRN for NAUSEA, #30 TAB.RAPDIS 3 Refills         Prov: MELCHOR ENCINAS         1/13/20       HYDROcodone-Acetaminophen 5-325 Mg (HYDROcodone-Acetaminophen 5-325 Mg) 1 Each     Tablet      1-2 TAB PO Q4H for Post Surgical PAin, #8 TAB 0 Refills         Prov: Rick Sheets         1/9/20       predniSONE (predniSONE) 20 Mg Tablet      40 MG PO QDAY, TAB 0 Refills         Reported         1/8/20       Fluticasone (Flovent HFA) 220 Mcg Inhaler      2 PUFF INH RTBID, #1 INH 6 Refills         Prov: Tank Maya          1/7/20       amLODIPine (amLODIPine) 5 Mg Tablet      5 MG PO QDAY         Reported         1/2/20       Pravastatin Sodium (Pravastatin Sodium) 10 Mg Tablet      10 MG PO QDAY, TAB         Reported         7/23/15       Losartan/Hydrochlorothiazide (Losartan/Hctz 100/25 Mg) 1 Tab Tablet      1 TAB PO QDAY, #30 TAB 0 Refills         Reported         7/23/15       MDI-Albuterol (Proventil HFA) 60 Puffs/Inh Puffs      2 PUFFS INH Q4-6HPRN         Reported         3/1/13       Omega-3 Fatty Acids/Fish Oil (Fish Oil 1000 Mg) 1,000 Mg Capsule      1000 MG PO QDAY, CAP         Reported         3/1/13       Aspirin EC (Aspirin EC) 81 Mg Tabec      81 MG PO QDAY         Reported         3/1/13      Medications Reviewed:  No Changes made to meds            IMPRESSION/PLAN      Impression      74-year-old female with extensive stage small cell lung cancer here for cycle 2     of immunotherapy after completing chemo immunotherapy.            Plan      Extensive stage small cell lung cancer: Patient is here today for her second     cycle of Tecentriq maintenance therapy.  She completed 4 cycles of chemotherapy     prior.  She is also gotten radiation to the lung mass as well as brain lesions.     She is tolerating immunotherapy very well and has no complaints today.  I revie    wed her labs and they are adequate for her to proceed to treatment.            Normocytic anemia: Secondary to chemotherapy, slowly improving.            Patient Education      Patient Education Provided:  Yes            Electronically signed by MELCHOR ENCINAS  05/05/2020 17:05       Disclaimer: Converted document may not contain table formatting or lab diagrams. Please see [employer] [facility] for the authenticated document.

## 2021-05-28 NOTE — PROGRESS NOTES
Patient: ANGEL DE JESUS     [id number]     Report: #[id number]  UNIT #: [id number]     : 1945    Encounter Date:2020  PRIMARY CARE: Heydi Julian  ***Signed***  --------------------------------------------------------------------------------------------------------------------  NURSE INTAKE      Visit Type      Established Patient Visit            Chief Complaint      LUNG CA            Referring Provider/Copies To      Primary Care Provider:  SARABJIT GARCIA      Copies To:   SARABJIT GARCIA            History and Present Illness      Past Oncology Illness History      Ms. De Jesus is a 74 yo F with a new diagnosis of small cell lung cancer.  She was    a heavy smoker until last week when she came into the hospital with shortness of    breath. Initial chest x-ray was concerning for a mass.  She then had a CT chest     on 20 which showed a mass at least 5.1cm involving the right hilum, right     paratracheal and       subcarinal regions causing encasement and narrowing of the right main bronchus     and right middle lobe bronchus and occlusion of the right upper lobe bronchus     with subsequent complete atelectasis of the right upper and right middle lobes.     Of note, there is a large hiatal hernia with gastric volvulus but no evidence of    gastric outlet obstruction.  Biopsy on 1/3/20 was positive for small cell lung     cancer.              Brain MRI 1/10/20:         1. There is an apparent focus of small possible abnormal diffusion restriction     within the subcortical or deep white matter of the right frontal lobe.      Increased associated T2 and FLAIR signal are seen.  The findings may indicate a     small focus of subacute ischemic change.  Findings secondary to T2 shine through    could also have this appearance.  2. Evidence for small multifocal intracranial     metastatic foci along the inferior margin of the right frontal lobe as well as     the peripheral medial  posterior margin of the left cerebellum.  3. Nonspecific     white matter signal changes are observed likely due to chronic small vessel     ischemic changes and age related changes.  Associated diffuse volume loss is     noted.      MOHAMUD GARRETT MD       Electronically Signed and Approved By: MOHAMUD GARRETT MD    on 1/10/2020 at 8:18               PET/CT 1/13/20      CONCLUSION:         1. No significant change in size of a right perihilar/mediastinal mass with     hypermetabolic activity consistent with malignancy.  The mass obstructs the     right upper and right middle lobe bronchi with associated atelectasis, which is     also unchanged.      2. No evidence of metastatic disease outside the chest.      3. Chronic changes as described above.            Cycle 1 of carboplatin plus etoposide on 1/22/2020.  Added Tecentriq with cycle     2 on 2/4/2020.  Completed 4 cycles of chemotherapy before continuing Tecentriq     immunotherapy alone.            Completed whole brain radiation and consolidative right lung radiation on 4 /21/2020.            CT of the chest, abdomen, and pelvis on 6/25/2020: Significant improvement in     the right paratracheal and right hilar mass.  For example the right paratracheal    mass is 1.7 cm in greatest dimension and was previously 3.6 x 5.4 cm.  The right    upper lobe and right middle lobe are now normally aerated.  There are no new     pulmonary nodules or masses.  There is no metastatic disease in the abdomen     pelvis.  Patient does have incidental findings of emphysema and coronary     atherosclerosis.            On Tecentriq maintenance every 3 weeks until progression or intolerance.            HPI - Oncology Interim      Patient comes in today for her next cycle of Tecentriq.  She is tolerating it     very well and has no complaints.  She is actually gained 2 to 3 pounds since her    last appointment.  Her energy is good.            Clinical Trial Participant      No             ECOG Performance Status      1            Most Recent Lab Findings      Laboratory Tests      11/24/20 09:51            PAST, FAMILY   Past Medical History      Past Medical History:  COPD, High Cholesterol, Hypertension, Short of Air      Hematology/Oncology (F):  Lung Cancer            Past Surgical History      None            Family History      Family History:  No Family History            Social History      Number of Children:  3            Tobacco Use      Tobacco status:  Former smoker            Substance Use      Substance use:  Denies use            REVIEW OF SYSTEMS      General:  Denies: Appetite Change, Fatigue, Fever, Night Sweats, Weight Gain,     Weight Loss      Eye:  Denies Blurred Vision, Denies Corrective Lenses, Denies Diplopia, Denies     Vision Changes      ENT:  Denies Headache, Denies Hearing Loss, Denies Hoarseness, Denies Sore     Throat      Cardiovascular:  Denies Chest Pain, Denies Palpitations      Respiratory:  Admits: Shortness of Air;          Denies: Cough, Coughing Blood, Productive Cough, Wheezing      Gastrointestinal:  Denies Bloody Stools, Denies Constipation, Denies Diarrhea,     Denies Nausea/Vomiting, Denies Problem Swallowing, Denies Unable to Control     Bowels      Genitourinary:  Denies Blood in Urine, Denies Incontinence, Denies Painful     Urination      Musculoskeletal:  Denies Back Pain, Denies Muscle Pain, Denies Painful Joints      Integumentary:  Denies Itching, Denies Lesions, Denies Rash      Neurologic:  Denies Dizziness, Denies Numbness\Tingling, Denies Seizures      Psychiatric:  Denies Anxiety, Denies Depression      Endocrine:  Denies Cold Intolerance, Denies Heat Intolerance      Hematologic/Lymphatic:  Denies Bruising, Denies Bleeding, Denies Enlarged Lymph     Nodes      Reproductive:  Denies: Menopause, Heavy Periods, Pregnant, Still Menstruating            VITAL SIGNS AND SCORES      Vitals      Weight 120 lbs 12.993 oz / 54.8 kg       Temperature 97.1 F / 36.17 C - Temporal      Pulse 81      Respirations 18      Blood Pressure 145/65 Sitting      Pulse Oximetry 100%, RM AIR            Pain Score      Experiencing any pain?:  No      Pain Scale Used:  Numerical      Pain Intensity:  0            Fatigue Score      Experiencing any fatigue?:  No      Fatigue (0-10 scale):  0 (none)            EXAM      General: Alert, cooperative, no acute distress      Eyes: Anicteric sclera, PERRLA      Respiratory: CTAB, normal respiratory effort      Abdomen: Normal active bowel sounds, no tenderness, no distention      Cardiovascular: RRR, no murmur, no peripheral edema      Skin: Normal tone, no rash, no lesions      Psychiatric: Appropriate affect, intact judgment      Neurologic: No focal sensory or motor deficits, no weakness, numbness, dizziness      Musculoskeletal: Normal muscle strength, normal muscle tone      Extremities: No clubbing, cyanosis, or deformities            PREVENTION      Hx Influenza Vaccination:  Yes      Date Influenza Vaccine Given:  Sep 18, 2020      Influenza Vaccine Declined:  No      2 or More Falls in Past Year?:  No      Fall Past Year with Injury?:  No      Hx Pneumococcal Vaccination:  Yes      Encouraged to follow-up with:  PCP regarding preventative exams.      Chart initiated by      SARA CARDOZA MA            ALLERGY/MEDS      Allergies      Coded Allergies:             NO KNOWN ALLERGIES (Unverified , 11/24/20)            Medications      Last Reconciled on 12/6/20 23:51 by MELCHOR ENCINAS      Prochlorperazine Maleate (Prochlorperazine Maleate) 10 Mg Tab      10 MG PO Q6H PRN for NAUSEA AND/OR VOMITING, #60 TAB 3 Refills         Prov: MELCHOR ENCINAS         1/13/20       Ondansetron Odt (ONDANSETRON ODT) 8 Mg Tab.rapdis      8 MG PO Q8H PRN for NAUSEA, #30 TAB.RAPDIS 3 Refills         Prov: MELCHOR ENCINAS         1/13/20       HYDROcodone-Acetaminophen 5-325 Mg (HYDROcodone-Acetaminophen 5-325 Mg) 1 Each     Tablet       1-2 TAB PO Q4H for Post Surgical PAin, #8 TAB 0 Refills         Prov: Rick Sheets         1/9/20       Fluticasone (Flovent HFA) 220 Mcg Inhaler      2 PUFF INH RTBID, #1 INH 6 Refills         Prov: Tank Maya         1/7/20       amLODIPine (amLODIPine) 5 Mg Tablet      5 MG PO QDAY         Reported         1/2/20       Pravastatin Sodium (Pravastatin Sodium) 10 Mg Tablet      10 MG PO QDAY, TAB         Reported         7/23/15       Losartan/Hydrochlorothiazide (Losartan/Hctz 100/25 Mg) 1 Tab Tablet      1 TAB PO QDAY, #30 TAB 0 Refills         Reported         7/23/15       MDI-Albuterol (Proventil HFA) 60 Puffs/Inh Puffs      2 PUFFS INH Q4-6HPRN         Reported         3/1/13       Aspirin EC (Aspirin EC) 81 Mg Tabec      81 MG PO QDAY         Reported         3/1/13      Medications Reviewed:  No Changes made to meds            IMPRESSION/PLAN      Diagnosis      SCLC (small cell lung carcinoma) - C34.90            Notes      New Diagnostics      * PET SKULLBASE MID THIGH SUBSQ, Routine         Dx: SCLC (small cell lung carcinoma) - C34.90            Plan      Extensive stage small cell lung cancer: Patient completed 4 cycles of     carboplatin and etoposide with Tecentriq.  She is here today for continued     maintenance therapy with Tecentriq.  She is tolerating it very well and is     gaining weight.  She is currently on the every 4 week dose.  We will plan to get    a PET scan prior to her next appointment.            CNS metastasis: Patient underwent whole brain radiation therapy by Dr. Alvarez.      Most recent brain MRI on 11/16/2020 was negative.            Patient Education      Patient Education Provided:  Yes            Electronically signed by MELCHOR ENCINAS  12/06/2020 23:51       Disclaimer: Converted document may not contain table formatting or lab diagrams. Please see [employer] [facility] for the authenticated document.

## 2021-05-28 NOTE — PROGRESS NOTES
Patient: ANGEL DE JESUS     [id number]     Report: #[id number]  UNIT #: [id number]     : 1945    Encounter Date:2020  PRIMARY CARE: Heydi Julian  ***Signed***  --------------------------------------------------------------------------------------------------------------------  NURSE INTAKE      Visit Type      Established Patient Visit            Chief Complaint      LUNG CA            Referring Provider/Copies To      Primary Care Provider:  SARABJIT GARCIA      Copies To:   SARABJIT GARCIA            History and Present Illness      Past Oncology Illness History      Ms. De Jesus is a 74 yo F with a new diagnosis of small cell lung cancer.  She was    a heavy smoker until last week when she came into the hospital with shortness of    breath. Initial chest x-ray was concerning for a mass.  She then had a CT chest     on 20 which showed a mass at least 5.1cm involving the right hilum, right     paratracheal and       subcarinal regions causing encasement and narrowing of the right main bronchus     and right middle lobe bronchus and occlusion of the right upper lobe bronchus     with subsequent complete atelectasis of the right upper and right middle lobes.     Of note, there is a large hiatal hernia with gastric volvulus but no evidence of    gastric outlet obstruction.  Biopsy on 1/3/20 was positive for small cell lung     cancer.              Brain MRI 1/10/20:         1. There is an apparent focus of small possible abnormal diffusion restriction     within the subcortical or deep white matter of the right frontal lobe.      Increased associated T2 and FLAIR signal are seen.  The findings may indicate a     small focus of subacute ischemic change.  Findings secondary to T2 shine through    could also have this appearance.  2. Evidence for small multifocal intracranial     metastatic foci along the inferior margin of the right frontal lobe as well as     the peripheral medial  posterior margin of the left cerebellum.  3. Nonspecific     white matter signal changes are observed likely due to chronic small vessel     ischemic changes and age related changes.  Associated diffuse volume loss is     noted.      MOHAMUD GARRETT MD       Electronically Signed and Approved By: MOHAMUD GARRETT MD    on 1/10/2020 at 8:18               PET/CT 1/13/20      CONCLUSION:         1. No significant change in size of a right perihilar/mediastinal mass with     hypermetabolic activity consistent with malignancy.  The mass obstructs the     right upper and right middle lobe bronchi with associated atelectasis, which is     also unchanged.      2. No evidence of metastatic disease outside the chest.      3. Chronic changes as described above.            Cycle 1 of carboplatin plus etoposide on 1/22/2020.  Added Tecentriq with cycle     2 on 2/4/2020.  Completed 4 cycles of chemotherapy before continuing Tecentriq     immunotherapy alone.            Completed whole brain radiation and consolidative right lung radiation on 4 /21/2020.            CT of the chest, abdomen, and pelvis on 6/25/2020: Significant improvement in     the right paratracheal and right hilar mass.  For example the right paratracheal    mass is 1.7 cm in greatest dimension and was previously 3.6 x 5.4 cm.  The right    upper lobe and right middle lobe are now normally aerated.  There are no new     pulmonary nodules or masses.  There is no metastatic disease in the abdomen     pelvis.  Patient does have incidental findings of emphysema and coronary     atherosclerosis.            On Tecentriq maintenance every 3 weeks until progression or intolerance.            HPI - Oncology Interim      Patient comes in today for continued treatment with Tecentriq.  She continues to    feel very well.  She is eating well and maintaining her weight.  Her only     complaint is that she has a bit of a runny nose.  She takes Benadryl as needed     and  admits it does sometimes make her sleepy.  She does not feel the Claritin or    Zyrtec were quite the same.  We did discuss the difference between these 2     medications and I encouraged her to try Claritin or Zyrtec for an extended     period of time.            Clinical Trial Participant      No            ECOG Performance Status      1            Most Recent Lab Findings      Laboratory Tests      11/3/20 08:39            PAST, FAMILY   Past Medical History      Past Medical History:  COPD, High Cholesterol, Hypertension, Short of Air      Hematology/Oncology (F):  Lung Cancer            Past Surgical History      None            Family History      Family History:  No Family History            Social History      Number of Children:  3            Tobacco Use      Tobacco status:  Former smoker            Substance Use      Substance use:  Denies use            REVIEW OF SYSTEMS      General:  Admits: Fatigue;          Denies: Appetite Change, Fever, Night Sweats, Weight Gain, Weight Loss      Eye:  Denies Blurred Vision, Denies Corrective Lenses, Denies Diplopia, Denies     Vision Changes      ENT:  Denies Headache, Denies Hearing Loss, Denies Hoarseness, Denies Sore     Throat      Other      Runny nose      Cardiovascular:  Denies Chest Pain, Denies Palpitations      Respiratory:  Denies: Cough, Coughing Blood, Productive Cough, Shortness of Air,    Wheezing      Gastrointestinal:  Denies Bloody Stools, Denies Constipation, Denies Diarrhea,     Denies Nausea/Vomiting, Denies Problem Swallowing, Denies Unable to Control     Bowels      Genitourinary:  Denies Blood in Urine, Denies Incontinence, Denies Painful     Urination      Musculoskeletal:  Denies Back Pain, Denies Muscle Pain, Denies Painful Joints      Integumentary:  Denies Itching, Denies Lesions, Denies Rash      Neurologic:  Denies Dizziness, Denies Numbness\Tingling, Denies Seizures      Psychiatric:  Denies Anxiety, Denies Depression       Endocrine:  Denies Cold Intolerance, Denies Heat Intolerance      Hematologic/Lymphatic:  Denies Bruising, Denies Bleeding, Denies Enlarged Lymph     Nodes      Reproductive:  Denies: Menopause, Heavy Periods, Pregnant, Still Menstruating            VITAL SIGNS AND SCORES      Vitals      Weight 117 lbs 1.028 oz / 53.1 kg      Temperature 97.4 F / 36.33 C - Temporal      Pulse 85      Respirations 20      Blood Pressure 124/68 Sitting      Pulse Oximetry 96%, RM AIR            Pain Score      Experiencing any pain?:  No      Pain Scale Used:  Numerical      Pain Intensity:  0            Fatigue Score      Experiencing any fatigue?:  Yes      Fatigue (0-10 scale):  1            EXAM      General: Alert, cooperative, no acute distress      Eyes: Anicteric sclera, PERRLA      Respiratory: CTAB, normal respiratory effort      Abdomen: Normal active bowel sounds, no tenderness, no distention      Cardiovascular: RRR, no murmur, no peripheral edema      Skin: Normal tone, no rash, no lesions      Psychiatric: Appropriate affect, intact judgment      Neurologic: No focal sensory or motor deficits, no weakness, numbness, dizziness      Musculoskeletal: Normal muscle strength, normal muscle tone      Extremities: No clubbing, cyanosis, or deformities            PREVENTION      Hx Influenza Vaccination:  Yes      Date Influenza Vaccine Given:  Sep 18, 2020      Influenza Vaccine Declined:  No      2 or More Falls in Past Year?:  No      Fall Past Year with Injury?:  No      Encouraged to follow-up with:  PCP regarding preventative exams.      Chart initiated by      SARA CARDOZA MA            ALLERGY/MEDS      Allergies      Coded Allergies:             NO KNOWN ALLERGIES (Unverified , 11/3/20)            Medications      Last Reconciled on 11/4/20 14:21 by MELCHOR ENCINAS      Prochlorperazine Maleate (Prochlorperazine Maleate) 10 Mg Tab      10 MG PO Q6H PRN for NAUSEA AND/OR VOMITING, #60 TAB 3 Refills         Prov:  MELCHOR ENCINAS         1/13/20       Ondansetron Odt (ONDANSETRON ODT) 8 Mg Tab.rapdis      8 MG PO Q8H PRN for NAUSEA, #30 TAB.RAPDIS 3 Refills         Prov: MELCHOR ENCINAS         1/13/20       HYDROcodone-Acetaminophen 5-325 Mg (HYDROcodone-Acetaminophen 5-325 Mg) 1 Each     Tablet      1-2 TAB PO Q4H for Post Surgical PAin, #8 TAB 0 Refills         Prov: SudeepRick         1/9/20       Fluticasone (Flovent HFA) 220 Mcg Inhaler      2 PUFF INH RTBID, #1 INH 6 Refills         Prov: ZulmaTank         1/7/20       amLODIPine (amLODIPine) 5 Mg Tablet      5 MG PO QDAY         Reported         1/2/20       Pravastatin Sodium (Pravastatin Sodium) 10 Mg Tablet      10 MG PO QDAY, TAB         Reported         7/23/15       Losartan/Hydrochlorothiazide (Losartan/Hctz 100/25 Mg) 1 Tab Tablet      1 TAB PO QDAY, #30 TAB 0 Refills         Reported         7/23/15       MDI-Albuterol (Proventil HFA) 60 Puffs/Inh Puffs      2 PUFFS INH Q4-6HPRN         Reported         3/1/13       Aspirin EC (Aspirin EC) 81 Mg Tabec      81 MG PO QDAY         Reported         3/1/13      Medications Reviewed:  No Changes made to meds            IMPRESSION/PLAN      Plan      Extensive stage small cell lung cancer: Patient completed 4 cycles of     carboplatin and etoposide with Tecentriq.  She is here today for continued     maintenance therapy with Tecentriq.  She is tolerating it very well and would     like to come less frequently if possible.  I will discuss switching her to the     every 4-week dose schedule.  She will return to clinic to see me in 3 weeks with    a result of the scheduled PET scan.            CNS metastasis: Patient received whole brain radiation therapy by Dr. Cheung.      Recent brain MRI has showed no evidence of disease progression.            Runny nose: Advised the patient to try Claritin or Zyrtec daily for 3 to 4 weeks    to help with allergic rhinitis.  She can still take Benadryl as needed.             Patient Education      Patient Education Provided:  Yes            Electronically signed by MELCHOR ENCINAS  11/04/2020 14:22       Disclaimer: Converted document may not contain table formatting or lab diagrams. Please see [employer] [facility] for the authenticated document.

## 2021-05-28 NOTE — PROGRESS NOTES
Patient: ANGEL DE JESUS     Acct: [id number]     Report: #[id number]  UNIT #: [id number]     : 1945    Encounter Date:2020  PRIMARY CARE: Heydi Julian  ***Signed***  --------------------------------------------------------------------------------------------------------------------  NURSE INTAKE      Visit Type      Established Patient Visit            Chief Complaint      LUNG CANCER            Referring Provider/Copies To      [facility]      Copies To:   Heydi Julian            History and Present Illness      Past Oncology Illness History      Ms. De Jesus is a 75 yo F with a new diagnosis of small cell lung cancer.  She was    a heavy smoker until last week when she came into the hospital with shortness of    breath. Initial chest x-ray was concerning for a mass.  She then had a CT chest     on 20 which showed a mass at least 5.1cm involving the right hilum, right     paratracheal and       subcarinal regions causing encasement and narrowing of the right main bronchus     and right middle lobe bronchus and occlusion of the right upper lobe bronchus     with subsequent complete atelectasis of the right upper and right middle lobes.     Of note, there is a large hiatal hernia with gastric volvulus but no evidence of    gastric outlet obstruction.  Biopsy on 1/3/20 was positive for small cell lung     cancer.              Brain MRI 1/10/20:         1. There is an apparent focus of small possible abnormal diffusion restriction     within the subcortical or deep white matter of the right frontal lobe.      Increased associated T2 and FLAIR signal are seen.  The findings may indicate a     small focus of subacute ischemic change.  Findings secondary to T2 shine through    could also have this appearance.  2. Evidence for small multifocal intracranial     metastatic foci along the inferior margin of the right frontal lobe as well as     the  peripheral medial posterior margin of the left cerebellum.  3. Nonspecific     white matter signal changes are observed likely due to chronic small vessel     ischemic changes and age related changes.  Associated diffuse volume loss is     noted.      MOHAMUD GARRETT MD       Electronically Signed and Approved By: MOHAMUD GARRETT MD    on 1/10/2020 at 8:18               PET/CT 1/13/20      CONCLUSION:         1. No significant change in size of a right perihilar/mediastinal mass with     hypermetabolic activity consistent with malignancy.  The mass obstructs the     right upper and right middle lobe bronchi with associated atelectasis, which is     also unchanged.      2. No evidence of metastatic disease outside the chest.      3. Chronic changes as described above.            Cycle 1 of carboplatin plus etoposide on 1/22/2020.  Added Tecentriq with cycle     2 on 2/4/2020.  Completed 4 cycles of chemotherapy before continuing Tecentriq     immunotherapy alone.            Cycle 5 of Tecentriq alone on 6/30/2020.  The plan is to continue this every 3     weeks until progression or intolerance.            Completed whole brain radiation and consolidative right lung radiation on     4/21/2020.            CT of the chest, abdomen, and pelvis on 6/25/2020: Significant improvement in     the right paratracheal and right hilar mass.  For example the right paratracheal    mass is 1.7 cm in greatest dimension and was previously 3.6 x 5.4 cm.  The right    upper lobe and right middle lobe are now normally aerated.  There are no new     pulmonary nodules or masses.  There is no metastatic disease in the abdomen     pelvis.  Patient does have incidental findings of emphysema and coronary     atherosclerosis.            HPI - Oncology Interim      I reviewed the results of the recent brain MRI with the patient.  This shows a     minimal residual focus of 4 mm in the right frontal lobe.  She has no complaints    of headache,  weakness, or other neurologic abnormality.  She has follow-up with     Dr. Alvarez in October.  So far she is tolerating immunotherapy very well.            Clinical Trial Participant      No            ECOG Performance Status      1            PAST, FAMILY   Past Medical History      Past Medical History:  COPD, High Cholesterol, Hypertension, Short of Air      Hematology/Oncology (F):  Lung Cancer            Past Surgical History      None            Family History      Family History:  No Family History            Social History      Marital Status:        Number of Children:  3            Tobacco Use      Tobacco status:  Former smoker      Smoking history:  25-50 pack years            Alcohol Use      Alcohol intake:  0-2 drinks per day            Substance Use      Substance use:  Denies use            REVIEW OF SYSTEMS      General:  Admits: Fatigue;          Denies: Appetite Change, Fever, Night Sweats, Weight Gain, Weight Loss      Eye:  Denies Blurred Vision, Denies Corrective Lenses, Denies Diplopia, Denies     Vision Changes      ENT:  Denies Headache, Denies Hearing Loss, Denies Hoarseness, Denies Sore     Throat      Cardiovascular:  Denies Chest Pain, Denies Palpitations      Respiratory:  Denies: Cough, Coughing Blood, Productive Cough, Shortness of Air,    Wheezing      Gastrointestinal:  Denies Bloody Stools, Denies Constipation, Denies Diarrhea,     Denies Nausea/Vomiting, Denies Problem Swallowing, Denies Unable to Control     Bowels      Genitourinary:  Denies Blood in Urine, Denies Incontinence, Denies Painful     Urination      Musculoskeletal:  Denies Back Pain, Denies Muscle Pain, Denies Painful Joints      Integumentary:  Denies Itching, Denies Lesions, Denies Rash      Neurologic:  Denies Dizziness, Denies Numbness\Tingling, Denies Seizures      Psychiatric:  Denies Anxiety, Denies Depression      Endocrine:  Denies Cold Intolerance, Denies Heat Intolerance      Hematologic/Lymphatic:   Denies Bruising, Denies Bleeding, Denies Enlarged Lymph     Nodes      Reproductive:  Denies: Menopause, Heavy Periods, Pregnant, Still Menstruating            VITAL SIGNS AND SCORES      Vitals      Weight 118 lbs 13.247 oz / 53.9 kg      Temperature 98.0 F / 36.67 C - Temporal      Pulse 64      Respirations 18      Blood Pressure 115/53 Sitting      Pulse Oximetry 99%, ROOM AIR            Pain Score      Experiencing any pain?:  No      Pain Scale Used:  Numerical      Pain Intensity:  0            Fatigue Score      Experiencing any fatigue?:  Yes      Fatigue (0-10 scale):  3            EXAM      General: Alert, cooperative, no acute distress, chronically ill-appearing      Eyes: Anicteric sclera, PERRLA      HEENT: Oropharynx clear, no exudates      Respiratory: CTAB, normal respiratory effort      Abdomen: Normal active bowel sounds, no tenderness, no distention      Cardiovascular: RRR, no murmur, no peripheral edema      Skin: Normal tone, no rash, no lesions      Psychiatric: Appropriate affect, intact judgment      Neurologic: No focal sensory or motor deficits, no weakness, numbness, dizziness      Musculoskeletal: Normal muscle strength, normal muscle tone      Extremities: No clubbing, cyanosis, or deformities            PREVENTION      Hx Influenza Vaccination:  Yes      Date Influenza Vaccine Given:  Oct 1, 2018      Influenza Vaccine Declined:  No      2 or More Falls in Past Year?:  No      Fall Past Year with Injury?:  No      Hx Pneumococcal Vaccination:  Yes      Encouraged to follow-up with:  PCP regarding preventative exams.      Chart initiated by      CHER AMADOR CMA            ALLERGY/MEDS      Allergies      Coded Allergies:             NO KNOWN ALLERGIES (Unverified , 8/11/20)            Medications      Last Reconciled on 8/11/20 17:31 by MELCHOR ENCINAS      Prochlorperazine Maleate (Prochlorperazine Maleate) 10 Mg Tab      10 MG PO Q6H PRN for NAUSEA AND/OR VOMITING, #60 TAB 3 Refills          Prov: MELCHOR ENCINAS         1/13/20       Ondansetron Odt (ONDANSETRON ODT) 8 Mg Tab.rapdis      8 MG PO Q8H PRN for NAUSEA, #30 TAB.RAPDIS 3 Refills         Prov: MELCHOR ENCINAS         1/13/20       HYDROcodone-Acetaminophen 5-325 Mg (HYDROcodone-Acetaminophen 5-325 Mg) 1 Each     Tablet      1-2 TAB PO Q4H for Post Surgical PAin, #8 TAB 0 Refills         Prov: Rick Sheets         1/9/20       Fluticasone (Flovent HFA) 220 Mcg Inhaler      2 PUFF INH RTBID, #1 INH 6 Refills         Prov: Tank Maya         1/7/20       amLODIPine (amLODIPine) 5 Mg Tablet      5 MG PO QDAY         Reported         1/2/20       Pravastatin Sodium (Pravastatin Sodium) 10 Mg Tablet      10 MG PO QDAY, TAB         Reported         7/23/15       Losartan/Hydrochlorothiazide (Losartan/Hctz 100/25 Mg) 1 Tab Tablet      1 TAB PO QDAY, #30 TAB 0 Refills         Reported         7/23/15       MDI-Albuterol (Proventil HFA) 60 Puffs/Inh Puffs      2 PUFFS INH Q4-6HPRN         Reported         3/1/13       Aspirin EC (Aspirin EC) 81 Mg Tabec      81 MG PO QDAY         Reported         3/1/13      Medications Reviewed:  No Changes made to meds            IMPRESSION/PLAN      Impression      75-year-old female with extensive stage small cell lung cancer currently on     immunotherapy.            Plan      Extensive stage small cell lung cancer: Patient completed 4 cycles of     carboplatin and etoposide with Tecentriq.  She is also completed consolidative     radiation to the chest and brain.  She comes in today for the 6 cycle of     maintenance Tecentriq without chemotherapy.  MRI of the brain shows a persistent    4 mm nodule with no enhancement.  She has follow-up with Dr. Alvarez in October.     I will go ahead and schedule another brain MRI in 3 months.  I also plan for her    next CT CAP toward the end of September and will follow-up with her after.      Since she is stable on immunotherapy she only needs to see me every  other cycle.            TSH elevation: Patient's TSH was mildly elevated 2 months ago.  We will repeat     the lab today and continue to monitor.  Patient is currently asymptomatic with     regard to any endocrine type complaints.            Patient Education      Patient Education Provided:  Yes            Electronically signed by MELCHOR ENCINAS  08/11/2020 17:31       Disclaimer: Converted document may not contain table formatting or lab diagrams. Please see [employer] [facility] for the authenticated document.

## 2021-05-28 NOTE — PROGRESS NOTES
Patient: ANGEL DE JESUS     Acct: [id number]     Report: #[id number]  UNIT #: [id number]     : 1945    Encounter Date:2020  PRIMARY CARE: Heydi Julian  ***Signed***  --------------------------------------------------------------------------------------------------------------------  NURSE INTAKE      Visit Type      Established Patient Visit            Chief Complaint      CHEMO TX            Referring Provider/Copies To      [facility]      Copies To:   Heydi Julian            History and Present Illness      Past Oncology Illness History      Ms. De Jesus is a 73 yo F with a new diagnosis of small cell lung cancer.  She was    a heavy smoker until last week when she came into the hospital with shortness of    breath. Initial chest x-ray was concerning for a mass.  She then had a CT chest     on 20 which showed a mass at least 5.1cm involving the right hilum, right     paratracheal and       subcarinal regions causing encasement and narrowing of the right main bronchus     and right middle lobe bronchus and occlusion of the right upper lobe bronchus     with subsequent complete atelectasis of the right upper and right middle lobes.     Of note, there is a large hiatal hernia with gastric volvulus but no evidence of    gastric outlet obstruction.  Biopsy on 1/3/20 was positive for small cell lung     cancer.              Brain MRI 1/10/20:         1. There is an apparent focus of small possible abnormal diffusion restriction     within the subcortical or deep white matter of the right frontal lobe.      Increased associated T2 and FLAIR signal are seen.  The findings may indicate a     small focus of subacute ischemic change.  Findings secondary to T2 shine through    could also have this appearance.  2. Evidence for small multifocal intracranial     metastatic foci along the inferior margin of the right frontal lobe as well as     the peripheral  medial posterior margin of the left cerebellum.  3. Nonspecific     white matter signal changes are observed likely due to chronic small vessel     ischemic changes and age related changes.  Associated diffuse volume loss is     noted.      MOHAMUD GARRETT MD       Electronically Signed and Approved By: MOHAMUD GARRETT MD    on 1/10/2020 at 8:18               PET/CT 1/13/20      CONCLUSION:         1. No significant change in size of a right perihilar/mediastinal mass with     hypermetabolic activity consistent with malignancy.  The mass obstructs the     right upper and right middle lobe bronchi with associated atelectasis, which is     also unchanged.      2. No evidence of metastatic disease outside the chest.      3. Chronic changes as described above.            Cycle 1 of carboplatin plus etoposide on 1/22/2020      Cycle 2 of carboplatin plus etoposide with Tecentriq on 2/4/2020      Cycle 3 of carboplatin plus etoposide with Tecentriq on 2/25/2020      Cycle 4 of carboplatin plus etoposide with Tecentriq on 3/17/2020      Cycle 1 of Tecentriq alone on 4/7/2020            HPI - Oncology Interim      Is in today prior to the first cycle of Tecentriq maintenance.  She reports that    she continues to lose a little bit of weight but she is eating everything in     sight.  Her appetite is very good.  She has no significant side effects at this     time.  She is currently getting radiation to her lung and brain.            Clinical Trial Participant      No            ECOG Performance Status      1            Most Recent Lab Findings      Laboratory Tests      4/7/20 08:59            PAST, FAMILY   Past Medical History      Past Medical History:  COPD, High Cholesterol, Hypertension, Short of Air      Hematology/Oncology (F):  Lung Cancer            Past Surgical History      None            Family History      Family History:  No Family History            Social History      Marital Status:        Number of  Children:  3            Tobacco Use      Tobacco status:  Former smoker      Smoking history:  25-50 pack years            Alcohol Use      Alcohol intake:  0-2 drinks per day            Substance Use      Substance use:  Denies use            REVIEW OF SYSTEMS      General:  Admits: Weight Loss;          Denies: Appetite Change, Fatigue, Fever, Night Sweats, Weight Gain      Eye:  Denies Blurred Vision, Denies Corrective Lenses, Denies Diplopia, Denies     Vision Changes      ENT:  Denies Headache, Denies Hearing Loss, Denies Hoarseness, Denies Sore Th    roat      Cardiovascular:  Denies Chest Pain, Denies Palpitations      Respiratory:  Denies: Coughing Blood, Productive Cough, Shortness of Air,     Wheezing      Gastrointestinal:  Denies Bloody Stools, Denies Constipation, Denies Diarrhea,     Denies Nausea/Vomiting, Denies Problem Swallowing, Denies Unable to Control     Bowels      Genitourinary:  Denies Blood in Urine, Denies Incontinence, Denies Painful     Urination      Musculoskeletal:  Denies Back Pain, Denies Muscle Pain, Denies Painful Joints      Integumentary:  Denies Itching, Denies Lesions, Denies Rash      Neurologic:  Denies Dizziness, Denies Numbness\Tingling, Denies Seizures      Psychiatric:  Denies Anxiety, Denies Depression      Endocrine:  Denies Cold Intolerance, Denies Heat Intolerance      Hematologic/Lymphatic:  Denies Bruising, Denies Bleeding, Denies Enlarged Lymph     Nodes      Reproductive:  Denies: Menopause, Heavy Periods, Pregnant, Still Menstruating            VITAL SIGNS AND SCORES      Vitals      Weight 128 lbs 1.396 oz / 58.1 kg      Temperature 98.6 F / 37 C - Temporal      Pulse 104      Respirations 16      Blood Pressure 108/81 Sitting      Pulse Oximetry 95%, RM AIR            Pain Score      Experiencing any pain?:  No            Fatigue Score      Experiencing any fatigue?:  No            EXAM      General: Alert, cooperative, no acute distress      Eyes: Anicteric  sclera, PERRLA      HEENT: Oropharynx clear, no exudates      Respiratory: CTAB, normal respiratory effort      Abdomen: Normal active bowel sounds, no tenderness, no distention      Cardiovascular: RRR, no murmur, no peripheral edema      Skin: Normal tone, no rash, no lesions      Psychiatric: Appropriate affect, intact judgment      Neurologic: No focal sensory or motor deficits, no weakness, numbness, dizziness      Musculoskeletal: Normal muscle strength, normal muscle tone      Extremities: No clubbing, cyanosis, or deformities            PREVENTION      Hx Influenza Vaccination:  Yes      Date Influenza Vaccine Given:  Oct 1, 2018      Influenza Vaccine Declined:  No      2 or More Falls Past Year?:  No      Fall Past Year with Injury?:  No      Hx Pneumococcal Vaccination:  Yes      Encouraged to follow-up with:  PCP regarding preventative exams.      Chart initiated by      COLUMBA CH MA            ALLERGY/MEDS      Allergies      Coded Allergies:             NO KNOWN ALLERGIES (Unverified , 4/7/20)            Medications      Last Reconciled on 4/14/20 16:35 by MELCHOR ENCINAS      Prochlorperazine Maleate (Prochlorperazine Maleate) 10 Mg Tab      10 MG PO Q6H PRN for NAUSEA AND/OR VOMITING, #60 TAB 3 Refills         Prov: MELCHOR ENCINAS         1/13/20       Ondansetron Odt (ONDANSETRON ODT) 8 Mg Tab.rapdis      8 MG PO Q8H PRN for NAUSEA, #30 TAB.RAPDIS 3 Refills         Prov: MELCHOR ENCINAS         1/13/20       HYDROcodone-Acetaminophen 5-325 Mg (HYDROcodone-Acetaminophen 5-325 Mg) 1 Each     Tablet      1-2 TAB PO Q4H for Post Surgical PAin, #8 TAB 0 Refills         Prov: Rick Sheets         1/9/20       predniSONE (predniSONE) 20 Mg Tablet      40 MG PO QDAY, TAB 0 Refills         Reported         1/8/20       Fluticasone (Flovent HFA) 220 Mcg Inhaler      2 PUFF INH RTBID, #1 INH 6 Refills         Prov: Tank Maya         1/7/20       amLODIPine (amLODIPine) 5 Mg Tablet      5 MG PO QDAY          Reported         1/2/20       Pravastatin Sodium (Pravastatin Sodium) 10 Mg Tablet      10 MG PO QDAY, TAB         Reported         7/23/15       Losartan/Hydrochlorothiazide (Losartan/Hctz 100/25 Mg) 1 Tab Tablet      1 TAB PO QDAY, #30 TAB 0 Refills         Reported         7/23/15       MDI-Albuterol (Proventil HFA) 60 Puffs/Inh Puffs      2 PUFFS INH Q4-6HPRN         Reported         3/1/13       Omega-3 Fatty Acids/Fish Oil (Fish Oil 1000 Mg) 1,000 Mg Capsule      1000 MG PO QDAY, CAP         Reported         3/1/13       Aspirin EC (Aspirin EC) 81 Mg Tabec      81 MG PO QDAY         Reported         3/1/13      Medications Reviewed:  No Changes made to meds            IMPRESSION/PLAN      Impression      74-year-old female with extensive stage small cell lung cancer.            Plan      Extensive stage muscle lung cancer: Patient is here today for her first cycle of    Tecentriq maintenance therapy.  She is also getting radiation to the lung and     brain.  She is tolerated chemo and immunotherapy very well.  I reviewed her labs    and she will proceed to treatment.            Nutrition: Patient continues to lose weight although she says her appetite is     great and she is eating everything in sight.  I expect that her weight will     improve now that she is no longer getting chemotherapy.  She was also seen by     our nutritionist today.  Interestingly to the nutritionist she reported that her    appetite had declined slightly and that she was eating small frequent meals.      They discussed increasing her oral supplementation including boost or Ensure.            Normocytic anemia: Secondary to chemotherapy.  Her hemoglobin improved slightly     from 8.5 up to 9 today.  Anticipate that it will continue to improve now she is     completed chemotherapy.            Thrombocytosis: Patient's platelet count is 516 today.  In the past it has been     as high as 759.  I assume that this was likely a reactive  process given her     underlying inflammation due to her malignancy.  However, if her high platelet     count persists or worsens I will evaluate for other hematologic processes.            CKD: Patient's creatinine baseline is likely 0.9-1.0.  Her creatinine today is     1.07 and her GFR is 51.  This is not significantly different from her baseline.     We will continue to monitor.            Abnormal TSH: Patient's TSH over the last 3 visits has been slightly elevated.      Today is actually slightly improved at 4.6.  It was previously as high as 5.2.      This is unlikely to represent true hypothyroidism.  We will continue to monitor.     I will plan to check her free T4 at the next visit.            Patient Education      Patient Education Provided:  Yes            Electronically signed by MELCHOR ENCINAS  04/14/2020 16:35       Disclaimer: Converted document may not contain table formatting or lab diagrams. Please see [employer] [facility] for the authenticated document.

## 2021-05-28 NOTE — PROGRESS NOTES
Patient: ANGEL DE JESUS     Acct: [id number]     Report: #[id number]  UNIT #: [id number]     : 1945    Encounter Date:2020  PRIMARY CARE: Heydi Julian  ***Signed***  --------------------------------------------------------------------------------------------------------------------  NURSE INTAKE      Visit Type      Established Patient Visit            Chief Complaint      LUNG CANCER            Referring Provider/Copies To      Primary Care Provider:  HEYDI Julian [facility] [[email or web address]      Copies To:   Heydi Julian            History and Present Illness      Past Oncology Illness History      Ms. De Jesus is a 73 yo F with a new diagnosis of small cell lung cancer.  She was    a heavy smoker until last week when she came into the hospital with shortness of    breath. Initial chest x-ray was concerning for a mass.  She then had a CT chest     on 20 which showed a mass at least 5.1cm involving the right hilum, right     paratracheal and       subcarinal regions causing encasement and narrowing of the right main bronchus     and right middle lobe bronchus and occlusion of the right upper lobe bronchus w    ith subsequent complete atelectasis of the right upper and right middle lobes.      Of note, there is a large hiatal hernia with gastric volvulus but no evidence of    gastric outlet obstruction.  Biopsy on 1/3/20 was positive for small cell lung     cancer.              Brain MRI 1/10/20:         1. There is an apparent focus of small possible abnormal diffusion restriction     within the subcortical or deep white matter of the right frontal lobe.      Increased associated T2 and FLAIR signal are seen.  The findings may indicate a     small focus of subacute ischemic change.  Findings secondary to T2 shine through    could also have this appearance.  2. Evidence for small multifocal intracranial     metastatic foci along the inferior margin of the right frontal lobe as well as     the  peripheral medial posterior margin of the left cerebellum.  3. Nonspecific     white matter signal changes are observed likely due to chronic small vessel isc    hemic changes and age related changes.  Associated diffuse volume loss is noted.      MOHAMUD GARRETT MD       Electronically Signed and Approved By: MOHAMUD GARRETT MD    on 1/10/2020 at 8:18               PET/CT 1/13/20      CONCLUSION:         1. No significant change in size of a right perihilar/mediastinal mass with     hypermetabolic activity consistent with malignancy.  The mass obstructs the     right upper and right middle lobe bronchi with associated atelectasis, which is     also unchanged.      2. No evidence of metastatic disease outside the chest.      3. Chronic changes as described above.            Cycle 1 of carboplatin plus etoposide on 1/22/2020      Cycle 2 of carboplatin plus etoposide with Tecentriq on 2/4/2020      Cycle 3 of carboplatin plus etoposide with Tecentriq on 2/25/2020            HPI - Oncology Interim      Ms. De Jesus is here today prior to cycle 4 of carboplatin and etoposide with     Tecentriq.  She states that she feels remarkably well.  She has good energy and     her eating is significantly improved she says she is eating herself out of house    at home.  Despite this she has what lost a little bit of weight and her pants     are more loose fitting.            Clinical Trial Participant      No            ECOG Performance Status      1            Most Recent Lab Findings      Laboratory Tests      3/17/20 08:50            PAST, FAMILY   Past Medical History      Past Medical History:  COPD, High Cholesterol, Hypertension, Short of Air      Hematology/Oncology (F):  Lung Cancer            Past Surgical History      None            Family History      Family History:  No Family History            Social History      Marital Status:        Number of Children:  3            Tobacco Use      Tobacco status:  Former  smoker      Smoking history:  25-50 pack years            Alcohol Use      Alcohol intake:  0-2 drinks per day            Substance Use      Substance use:  Denies use            REVIEW OF SYSTEMS      General:  Admits: Weight Loss;          Denies: Appetite Change, Fatigue, Fever, Night Sweats, Weight Gain      Eye:  Denies Blurred Vision, Denies Corrective Lenses, Denies Diplopia, Denies     Vision Changes      ENT:  Denies Headache, Denies Hearing Loss, Denies Hoarseness, Denies Sore     Throat      Cardiovascular:  Denies Chest Pain, Denies Palpitations      Respiratory:  Denies: Coughing Blood, Productive Cough, Shortness of Air,     Wheezing      Gastrointestinal:  Denies Bloody Stools, Denies Constipation, Denies Diarrhea,     Denies Nausea/Vomiting, Denies Problem Swallowing, Denies Unable to Control [place]    els      Genitourinary:  Denies Blood in Urine, Denies Incontinence, Denies Painful     Urination      Musculoskeletal:  Denies Back Pain, Denies Muscle Pain, Denies Painful Joints      Integumentary:  Denies Itching, Denies Lesions, Denies Rash      Neurologic:  Denies Dizziness, Denies Numbness\Tingling, Denies Seizures      Psychiatric:  Denies Anxiety, Denies Depression      Endocrine:  Denies Cold Intolerance, Denies Heat Intolerance      Hematologic/Lymphatic:  Denies Bruising, Denies Bleeding, Denies Enlarged Lymph     Nodes      Reproductive:  Denies: Menopause, Heavy Periods, Pregnant, Still Menstruating            VITAL SIGNS AND SCORES      Vitals      Height 5 ft  / 152.4 cm      Weight 135 lbs 9.326 oz / 61.5 kg      BSA 1.58 m2      BMI 26.5 kg/m2      Temperature 98.5 F / 36.94 C - Temporal      Pulse 89      Respirations 20      Blood Pressure 147/61 Sitting      Pulse Oximetry 98%, ROOM AIR            Pain Score      Experiencing any pain?:  No      Pain Scale Used:  Numerical      Pain Intensity:  0            Fatigue Score      Experiencing any fatigue?:  Yes      Fatigue (0-10 scale):   1            EXAM      General: Alert, cooperative, no acute distress      Eyes: Anicteric sclera, PERRLA      HEENT: Oropharynx clear, no exudates      Respiratory: CTAB, normal respiratory effort      Abdomen: Normal active bowel sounds, no tenderness, no distention      Cardiovascular: RRR, no murmur, no peripheral edema      Skin: Normal tone, no rash, no lesions      Psychiatric: Appropriate affect, intact judgment      Neurologic: No focal sensory or motor deficits, no weakness, numbness, dizziness      Musculoskeletal: Normal muscle strength, normal muscle tone      Extremities: No clubbing, cyanosis, or deformities            PREVENTION      Hx Influenza Vaccination:  Yes      Date Influenza Vaccine Given:  Oct 1, 2018      Influenza Vaccine Declined:  No      2 or More Falls Past Year?:  No      Fall Past Year with Injury?:  No      Hx Pneumococcal Vaccination:  Yes      Encouraged to follow-up with:  PCP regarding preventative exams.      Chart initiated by      PEPE WALDRON MA            ALLERGY/MEDS      Allergies      Coded Allergies:             NO KNOWN ALLERGIES (Unverified , 3/17/20)            Medications      Last Reconciled on 3/17/20 10:18 by MELCHOR ENCINAS      Prochlorperazine Maleate (Prochlorperazine Maleate) 10 Mg Tab      10 MG PO Q6H PRN for NAUSEA AND/OR VOMITING, #60 TAB 3 Refills         Prov: MELCHOR ENCINAS         1/13/20       Ondansetron Odt (ONDANSETRON ODT) 8 Mg Tab.rapdis      8 MG PO Q8H PRN for NAUSEA, #30 TAB.RAPDIS 3 Refills         Prov: MELCHOR ENCINAS         1/13/20       HYDROcodone-Acetaminophen 5-325 Mg (HYDROcodone-Acetaminophen 5-325 Mg) 1 Each     Tablet      1-2 TAB PO Q4H for Post Surgical PAin, #8 TAB 0 Refills         Prov: Rick Sheets         1/9/20       predniSONE (predniSONE) 20 Mg Tablet      40 MG PO QDAY, TAB 0 Refills         Reported         1/8/20       Fluticasone (Flovent HFA) 220 Mcg Inhaler      2 PUFF INH RTBID, #1 INH 6 Refills         Prov:  Luciana Maya         1/7/20       amLODIPine (amLODIPine) 5 Mg Tablet      5 MG PO QDAY         Reported         1/2/20       Pravastatin Sodium (Pravastatin Sodium) 10 Mg Tablet      10 MG PO QDAY, TAB         Reported         7/23/15       Losartan/Hydrochlorothiazide (Losartan/Hctz 100/25 Mg) 1 Tab Tablet      1 TAB PO QDAY, #30 TAB 0 Refills         Reported         7/23/15       MDI-Albuterol (Proventil HFA) 60 Puffs/Inh Puffs      2 PUFFS INH Q4-6HPRN         Reported         3/1/13       Omega-3 Fatty Acids/Fish Oil (Fish Oil 1000 Mg) 1,000 Mg Capsule      1000 MG PO QDAY, CAP         Reported         3/1/13       Aspirin EC (Aspirin EC) 81 Mg Tabec      81 MG PO QDAY         Reported         3/1/13      Medications Reviewed:  No Changes made to meds            IMPRESSION/PLAN      Impression      74-year-old female with extensive stage small cell lung cancer here for     chemotherapy.            Diagnosis      Small cell lung cancer - C34.90            Anemia - D64.9            Notes      New Diagnostics      * CBC With Auto Diff, WEEKLY         Dx: Small cell lung cancer - C34.90      New Referrals      * Radiation Therapy, As Soon As Possible         LUCAINA CARDOZA with RADIATION ONCOLOGY         Dx: Small cell lung cancer - C34.90            Plan      Extensive stage small cell lung cancer: Patient is here today for cycle 4 of     carboplatin/etoposide with Tecentriq.  This is actually cycle 3 of Tecentriq.      She is tolerating chemotherapy and immunotherapy very well without any     significant complaints.  I will refer her to radiation oncology in 3 weeks for     evaluation and treatment of presumed CNS metastasis.  She return to clinic here     in 3 weeks for her next dose of Tecentriq.  At that time we will discuss getting    restaging CT scans.            Normocytic anemia: I reviewed the patient's iron studies which are essentially     normal/slight iron deficiency.  I did not recommend iron  replacement at this     time.  Her anemia is most likely secondary to chemotherapy.  I will monitor her     labs weekly for the next 2 weeks in anticipation that she may need a blood     transfusion.  At this time she is asymptomatic.            BRIE: Patient's creatinine is improved from the last cycle.  She has increased     oral hydration.  Her baseline creatinine appears to be approximately 0.87.            Patient Education      Patient Education Provided:  Yes            Electronically signed by MELCHOR ENCINAS  03/17/2020 10:18       Disclaimer: Converted document may not contain table formatting or lab diagrams. Please see [facility] for the authenticated document.

## 2021-05-28 NOTE — PROGRESS NOTES
Patient: ANGEL DE JESUS     Acct: [id number]     Report: #[id number]  UNIT #: [id number]     : 1945    Encounter Date:2020  PRIMARY CARE: Heydi Julian  ***Signed***  --------------------------------------------------------------------------------------------------------------------  NURSE INTAKE      Visit Type      Established Patient Visit            Chief Complaint      LUNG CANCER (HERE FOR TX)            Referring Provider/Copies To      [facility]      Copies To:   Heydi Julian            History and Present Illness      Past Oncology Illness History      Ms. De Jesus is a 75 yo F with a new diagnosis of small cell lung cancer.  She was    a heavy smoker until last week when she came into the hospital with shortness of    breath. Initial chest x-ray was concerning for a mass.  She then had a CT chest     on 20 which showed a mass at least 5.1cm involving the right hilum, right     paratracheal and       subcarinal regions causing encasement and narrowing of the right main bronchus     and right middle lobe bronchus and occlusion of the right upper lobe bronchus     with subsequent complete atelectasis of the right upper and right middle lobes.     Of note, there is a large hiatal hernia with gastric volvulus but no evidence of    gastric outlet obstruction.  Biopsy on 1/3/20 was positive for small cell lung     cancer.              Brain MRI 1/10/20:         1. There is an apparent focus of small possible abnormal diffusion restriction     within the subcortical or deep white matter of the right frontal lobe.      Increased associated T2 and FLAIR signal are seen.  The findings may indicate a     small focus of subacute ischemic change.  Findings secondary to T2 shine through    could also have this appearance.  2. Evidence for small multifocal intracranial     metastatic foci along the inferior margin of the right frontal lobe as well as      the peripheral medial posterior margin of the left cerebellum.  3. Nonspecific     white matter signal changes are observed likely due to chronic small vessel     ischemic changes and age related changes.  Associated diffuse volume loss is     noted.      MOHAMUD GARRETT MD       Electronically Signed and Approved By: MOHAMUD GARRETT MD    on 1/10/2020 at 8:18               PET/CT 1/13/20      CONCLUSION:         1. No significant change in size of a right perihilar/mediastinal mass with     hypermetabolic activity consistent with malignancy.  The mass obstructs the     right upper and right middle lobe bronchi with associated atelectasis, which is     also unchanged.      2. No evidence of metastatic disease outside the chest.      3. Chronic changes as described above.            Cycle 1 of carboplatin plus etoposide on 1/22/2020.  Added Tecentriq with cycle     2 on 2/4/2020.  Completed 4 cycles of chemotherapy before continuing Tecentriq     immunotherapy alone.            Cycle 4 of Tecentriq alone on 6/9/2020.  The plan is to continue this every 3     weeks until progression or intolerance.            Completed whole brain radiation and consolidative right lung radiation on     4/21/2020.            HPI - Oncology Interim      Ms. De Jesus comes in for her fourth cycle of Tecentriq without chemotherapy.      She says she is tired today but that she is because her neighbors keep her     awake.  Her energy is very good overall.  She occasionally has sniffles but     relates this to allergies.  She denies shortness of breath, rash, diarrhea, or     any other signs of illness.            Clinical Trial Participant      No            ECOG Performance Status      0            Most Recent Lab Findings      Laboratory Tests      6/9/20 09:43            PAST, FAMILY   Past Medical History      Past Medical History:  COPD, High Cholesterol, Hypertension, Short of Air      Hematology/Oncology (F):  Lung Cancer             Past Surgical History      None            Family History      Family History:  No Family History            Social History      Marital Status:        Number of Children:  3            Tobacco Use      Tobacco status:  Former smoker      Smoking history:  25-50 pack years            Alcohol Use      Alcohol intake:  0-2 drinks per day            Substance Use      Substance use:  Denies use            REVIEW OF SYSTEMS      General:  Denies: Appetite Change, Fatigue, Fever, Night Sweats, Weight Gain,     Weight Loss      Eye:  Denies Blurred Vision, Denies Corrective Lenses, Denies Diplopia, Denies     Vision Changes      ENT:  Denies Headache, Denies Hearing Loss, Denies Hoarseness, Denies Sore     Throat      Cardiovascular:  Denies Chest Pain, Denies Palpitations      Respiratory:  Denies: Cough, Coughing Blood, Productive Cough, Shortness of Air,    Wheezing      Gastrointestinal:  Denies Bloody Stools, Denies Constipation, Denies Diarrhea,     Denies Nausea/Vomiting, Denies Problem Swallowing, Denies Unable to Control     Bowels      Genitourinary:  Denies Blood in Urine, Denies Incontinence, Denies Painful     Urination      Musculoskeletal:  Denies Back Pain, Denies Muscle Pain, Denies Painful Joints      Integumentary:  Denies Itching, Denies Lesions, Denies Rash      Neurologic:  Denies Dizziness, Denies Numbness\Tingling, Denies Seizures      Psychiatric:  Denies Anxiety, Denies Depression      Endocrine:  Denies Cold Intolerance, Denies Heat Intolerance      Hematologic/Lymphatic:  Denies Bruising, Denies Bleeding, Denies Enlarged Lymph     Nodes      Reproductive:  Denies: Menopause, Heavy Periods, Pregnant, Still Menstruating            VITAL SIGNS AND SCORES      Vitals      Weight 122 lbs 9.212 oz / 55.6 kg      Temperature 97.3 F / 36.28 C - Temporal      Pulse 87      Respirations 17      Blood Pressure 90/58 Sitting      Pulse Oximetry 100%, ROOM AIR            Pain Score      Experiencing  any pain?:  No      Pain Scale Used:  Numerical      Pain Intensity:  0            Fatigue Score      Experiencing any fatigue?:  No      Fatigue (0-10 scale):  0 (none)            EXAM      General: Alert, cooperative, no acute distress, well-appearing      Eyes: Anicteric sclera, PERRLA      HEENT: Oropharynx clear, no exudates      Respiratory: CTAB, normal respiratory effort      Abdomen: Normal active bowel sounds, no tenderness, no distention      Cardiovascular: RRR, no murmur, no peripheral edema      Skin: Normal tone, no rash, no lesions      Psychiatric: Appropriate affect, intact judgment      Neurologic: No focal sensory or motor deficits, no weakness, numbness, dizziness      Musculoskeletal: Normal muscle strength, normal muscle tone      Extremities: No clubbing, cyanosis, or deformities            PREVENTION      Hx Influenza Vaccination:  Yes      Date Influenza Vaccine Given:  Oct 1, 2018      Influenza Vaccine Declined:  No      2 or More Falls Past Year?:  No      Fall Past Year with Injury?:  No      Hx Pneumococcal Vaccination:  Yes      Encouraged to follow-up with:  PCP regarding preventative exams.      Chart initiated by      PEPE WALDRON MA            ALLERGY/MEDS      Allergies      Coded Allergies:             NO KNOWN ALLERGIES (Unverified , 6/9/20)            Medications      Last Reconciled on 6/17/20 20:58 by MELCHOR ENCINAS      Prochlorperazine Maleate (Prochlorperazine Maleate) 10 Mg Tab      10 MG PO Q6H PRN for NAUSEA AND/OR VOMITING, #60 TAB 3 Refills         Prov: MELCHOR ENCINAS         1/13/20       Ondansetron Odt (ONDANSETRON ODT) 8 Mg Tab.rapdis      8 MG PO Q8H PRN for NAUSEA, #30 TAB.RAPDIS 3 Refills         Prov: MELCHOR ENCINAS         1/13/20       HYDROcodone-Acetaminophen 5-325 Mg (HYDROcodone-Acetaminophen 5-325 Mg) 1 Each     Tablet      1-2 TAB PO Q4H for Post Surgical PAin, #8 TAB 0 Refills         Prov: Rick Sheets         1/9/20       Fluticasone (Flovent  HFA) 220 Mcg Inhaler      2 PUFF INH RTBID, #1 INH 6 Refills         Prov: Tank Maya         1/7/20       amLODIPine (amLODIPine) 5 Mg Tablet      5 MG PO QDAY         Reported         1/2/20       Pravastatin Sodium (Pravastatin Sodium) 10 Mg Tablet      10 MG PO QDAY, TAB         Reported         7/23/15       Losartan/Hydrochlorothiazide (Losartan/Hctz 100/25 Mg) 1 Tab Tablet      1 TAB PO QDAY, #30 TAB 0 Refills         Reported         7/23/15       MDI-Albuterol (Proventil HFA) 60 Puffs/Inh Puffs      2 PUFFS INH Q4-6HPRN         Reported         3/1/13       Aspirin EC (Aspirin EC) 81 Mg Tabec      81 MG PO QDAY         Reported         3/1/13      Medications Reviewed:  No Changes made to meds            IMPRESSION/PLAN      Impression      75-year-old female with metastatic small cell lung cancer currently on     immunotherapy.            Diagnosis      Small cell lung cancer - C34.90            Notes      New Diagnostics      * CT Abd/Pelvis/Chest W/Contrast, SCHEDULED PROCEDURE         Dx: Small cell lung cancer - C34.90            Plan      Extensive stage small cell lung cancer: Patient completed 4 cycles of     carboplatin and etoposide with Tecentriq.  She comes in today for her fourth     cycle of Tecentriq without chemotherapy.  I reviewed her labs and they are     adequate to proceed to treatment today.  We will plan for CT chest, abdomen, and    pelvis for restaging.  She will follow-up with me prior to cycle 6.  An MRI of     the brain is already scheduled on June 22 and follow-up with radiation oncology     after.            Patient Education      Patient Education Provided:  Yes            Electronically signed by MELCHOR ENCINAS  06/17/2020 20:58       Disclaimer: Converted document may not contain table formatting or lab diagrams. Please see [email or web address] for the authenticated document.

## 2021-05-28 NOTE — PROGRESS NOTES
Patient: ANGEL DE JESUS     Acct: [id number]     Report: #[id number]  UNIT #: [id number]     : 1945    Encounter Date:2020  PRIMARY CARE: Heydi Julian  ***Signed***  --------------------------------------------------------------------------------------------------------------------  NURSE INTAKE      Visit Type      Established Patient Visit            Chief Complaint      LUNG MASS            Referring Provider/Copies To      Primary Care Provider:  HEYDI GARCIA      Copies To:   HEYDI GARCIA            History and Present Illness      Past Oncology Illness History      Ms. De Jesus is a 75 yo F with a new diagnosis of small cell lung cancer.  She was    a heavy smoker until last week when she came into the hospital with shortness of    breath. Initial chest x-ray was concerning for a mass.  She then had a CT chest     on 20 which showed a mass at least 5.1cm involving the right hilum, right     paratracheal and       subcarinal regions causing encasement and narrowing of the right main bronchus     and right middle lobe bronchus and occlusion of the right upper lobe bronchus     with subsequent complete atelectasis of the right upper and right middle lobes.     Of note, there is a large hiatal hernia with gastric volvulus but no evidence of    gastric outlet obstruction.  Biopsy on 1/3/20 was positive for small cell lung     cancer.              Brain MRI 1/10/20:         1. There is an apparent focus of small possible abnormal diffusion restriction     within the subcortical or deep white matter of the right frontal lobe.      Increased associated T2 and FLAIR signal are seen.  The findings may indicate a     small focus of subacute ischemic change.  Findings secondary to T2 shine through    could also have this appearance.  2. Evidence for small multifocal intracranial     metastatic foci along the inferior margin of the right frontal lobe as well as     the peripheral medial  posterior margin of the left cerebellum.  3. Nonspecific     white matter signal changes are observed likely due to chronic small vessel     ischemic changes and age related changes.  Associated diffuse volume loss is     noted.      MOHAMUD GARRETT MD       Electronically Signed and Approved By: MOHAMUD GARRETT MD    on 1/10/2020 at 8:18               PET/CT 1/13/20      CONCLUSION:         1. No significant change in size of a right perihilar/mediastinal mass with     hypermetabolic activity consistent with malignancy.  The mass obstructs the     right upper and right middle lobe bronchi with associated atelectasis, which is     also unchanged.      2. No evidence of metastatic disease outside the chest.      3. Chronic changes as described above.            HPI - Oncology Interim      Ms. De Jesus was recently told that her MRI brain shows small metastatic lesions.     The decision was made by radiation oncology and pulmonary that she proceed to     chemotherapy soon so she is following up prior to getting her PET scan.  She     understands that she will likely get brain RT later.  Her only complaint is a la    rge area of dark purple bruising across her abdomen from the lovenox shots she     had in the hospital.            Clinical Trial Participant      No            ECOG Performance Status      1            Most Recent Lab Findings      Laboratory Tests      1/4/20 06:24            PAST, FAMILY   Past Medical History      Past Medical History:  COPD, High Cholesterol, Hypertension, Short of Air      Hematology/Oncology (F):  Lung Cancer            Past Surgical History      None            Family History      Family History:  No Family History            Social History      Marital Status:        Number of Children:  3            Tobacco Use      Tobacco status:  Former smoker      Smoking history:  25-50 pack years            Alcohol Use      Alcohol intake:  0-2 drinks per day            Substance Use       Substance use:  Denies use            REVIEW OF SYSTEMS      General:  Denies: Appetite Change, Fatigue, Fever, Night Sweats, Weight Gain,     Weight Loss      Eye:  Denies Blurred Vision, Denies Corrective Lenses, Denies Diplopia, Denies     Vision Changes      ENT:  Denies Headache, Denies Hearing Loss, Denies Hoarseness, Denies Sore Thro    at      Cardiovascular:  Denies Chest Pain, Denies Palpitations      Respiratory:  Denies: Coughing Blood, Productive Cough, Shortness of Air,     Wheezing      Gastrointestinal:  Denies Bloody Stools, Denies Constipation, Denies Diarrhea,     Denies Nausea/Vomiting, Denies Problem Swallowing, Denies Unable to Control     Bowels      Genitourinary:  Denies Blood in Urine, Denies Incontinence, Denies Painful     Urination      Musculoskeletal:  Denies Back Pain, Denies Muscle Pain, Denies Painful Joints      Integumentary:  Denies Itching, Denies Lesions, Denies Rash      Neurologic:  Denies Dizziness, Denies Numbness\Tingling, Denies Seizures      Psychiatric:  Denies Anxiety, Denies Depression      Endocrine:  Denies Cold Intolerance, Denies Heat Intolerance      Hematologic/Lymphatic:  Denies Bruising, Denies Bleeding, Denies Enlarged Lymph     Nodes      Reproductive:  Denies: Menopause, Heavy Periods, Pregnant, Still Menstruating            VITAL SIGNS AND SCORES      Vitals      Height 5 ft  / 152.4 cm      Weight 141 lbs 8.565 oz / 64.2 kg      BSA 1.61 m2      BMI 27.6 kg/m2      Temperature 97.9 F / 36.61 C - Temporal      Pulse 82      Respirations 18      Blood Pressure 108/54 Sitting, Left Arm      Pulse Oximetry 97%, ROOM AIR            Pain Score      Experiencing any pain?:  No      Pain Scale Used:  Numerical      Pain Intensity:  0            Fatigue Score      Experiencing any fatigue?:  No      Fatigue (0-10 scale):  0 (none)            EXAM      General: Alert, cooperative, no acute distress      Eyes: Anicteric sclera, PERRLA      HEENT: Oropharynx clear,  no exudates      Respiratory: CTAB, normal respiratory effort      Abdomen: Normal active bowel sounds, no tenderness, no distention      Cardiovascular: RRR, no murmur, no peripheral edema      Skin: large area of purple bruising across the abdomen with a firm mass about     the size of a grapefruit underneath that was likely a hematoma.        Psychiatric: Appropriate affect, intact judgment      Neurologic: No focal sensory or motor deficits, no weakness, numbness, dizziness      Musculoskeletal: Normal muscle strength, normal muscle tone      Extremities: No clubbing, cyanosis, or deformities            PREVENTION      Hx Influenza Vaccination:  No      Date Influenza Vaccine Given:  Oct 1, 2018      Influenza Vaccine Declined:  No      2 or More Falls Past Year?:  No      Fall Past Year with Injury?:  No      Hx Pneumococcal Vaccination:  Yes      Encouraged to follow-up with:  PCP regarding preventative exams.      Chart initiated by      CHER AMADOR [facility]            ALLERGY/MEDS      Allergies      Coded Allergies:             NO KNOWN ALLERGIES (Unverified , 1/13/20)            Medications      Last Reconciled on 1/16/20 22:10 by MELCHOR ENCINAS      Prochlorperazine Maleate (Prochlorperazine Maleate) 10 Mg Tab      10 MG PO Q6H PRN for NAUSEA AND/OR VOMITING, #60 TAB 3 Refills         Prov: MELCHOR ENCINAS         1/13/20       Ondansetron Odt (ONDANSETRON ODT) 8 Mg Tab.rapdis      8 MG PO Q8H PRN for NAUSEA, #30 TAB.RAPDIS 3 Refills         Prov: MELCHOR ENCINAS         1/13/20       HYDROcodone-Acetaminophen 5-325 Mg (HYDROcodone-Acetaminophen 5-325 Mg) 1 Each     Tablet      1-2 TAB PO Q4H for Post Surgical PAin, #8 TAB 0 Refills         Prov: Rick Sheets         1/9/20       predniSONE (predniSONE) 20 Mg Tablet      40 MG PO QDAY, TAB 0 Refills         Reported         1/8/20       Fluticasone (Flovent HFA) 220 Mcg Inhaler      2 PUFF INH RTBID, #1 INH 6 Refills         Prov: Tank Maya          1/7/20       amLODIPine (amLODIPine) 5 Mg Tablet      5 MG PO QDAY         Reported         1/2/20       Pravastatin Sodium (Pravastatin Sodium) 10 Mg Tablet      10 MG PO QDAY, TAB         Reported         7/23/15       Losartan/Hydrochlorothiazide (Losartan/Hctz 100/25 Mg) 1 Tab Tablet      1 TAB PO QDAY, #30 TAB 0 Refills         Reported         7/23/15       MDI-Albuterol (Proventil HFA) 60 Puffs/Inh Puffs      2 PUFFS INH Q4-6HPRN         Reported         3/1/13       Omega-3 Fatty Acids/Fish Oil (Fish Oil 1000 Mg) 1,000 Mg Capsule      1000 MG PO QDAY, CAP         Reported         3/1/13       Aspirin EC (Aspirin EC) 81 Mg Tabec      81 MG PO QDAY         Reported         3/1/13      Medications Reviewed:  No Changes made to meds            IMPRESSION/PLAN      Impression      74-year-old female with a new diagnosis of small cell lung cancer here to     discuss the plan for chemotherapy            Diagnosis      SCLC (small cell lung carcinoma) - C34.90            Notes      New Medications      * ONDANSETRON ODT 8 MG TAB.RAPDIS: 8 MG PO Q8H PRN NAUSEA #30      * Prochlorperazine Maleate 10 MG TAB: 10 MG PO Q6H PRN NAUSEA AND/OR VOMITING       #60      New Diagnostics      * CBC With Auto Diff, Routine         Dx: SCLC (small cell lung carcinoma) - C34.90      * CMP Comp Metabolic Panel, Routine         Dx: SCLC (small cell lung carcinoma) - C34.90      * Thyroid Profile, Routine         Dx: SCLC (small cell lung carcinoma) - C34.90            Plan      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  The need for whole brain radiation     versus SRS will be addressed in the future.  Patient consented to treatment with    carboplatin and etoposide plus atezolizumab starting with cycle 2.            Large hematoma of the abdomen: Patient reports this  developed following Lovenox     shots while in hospital.  She has not noticed any significant change since it     first appeared.  Of note her hemoglobin has dropped significantly since it was     last checked.  She is probably lost approximately 2 pints of blood.  We will     repeat labs today.            Patient Education      Patient Education Provided:  Yes            Electronically signed by MELCHOR ENCINAS  01/16/2020 22:10       Disclaimer: Converted document may not contain table formatting or lab diagrams. Please see [employer] for the authenticated document.

## 2021-05-28 NOTE — PROGRESS NOTES
Patient: ANGEL DE JESUS     Acct: [id number]     Report: #[id number]  UNIT #: [id number]     : 1945    Encounter Date:2020  PRIMARY CARE: Heydi Julian  ***Signed***  --------------------------------------------------------------------------------------------------------------------  NURSE INTAKE      Visit Type      Established Patient Visit            Chief Complaint      LUNG CANCER (HERE FOR TX)            Referring Provider/Copies To      [facility]      Copies To:   Heydi Julian            History and Present Illness      Past Oncology Illness History      Ms. De Jesus is a 75 yo F with a new diagnosis of small cell lung cancer.  She was    a heavy smoker until last week when she came into the hospital with shortness of    breath. Initial chest x-ray was concerning for a mass.  She then had a CT chest     on 20 which showed a mass at least 5.1cm involving the right hilum, right     paratracheal and       subcarinal regions causing encasement and narrowing of the right main bronchus     and right middle lobe bronchus and occlusion of the right upper lobe bronchus     with subsequent complete atelectasis of the right upper and right middle lobes.     Of note, there is a large hiatal hernia with gastric volvulus but no evidence of    gastric outlet obstruction.  Biopsy on 1/3/20 was positive for small cell lung     cancer.              Brain MRI 1/10/20:         1. There is an apparent focus of small possible abnormal diffusion restriction     within the subcortical or deep white matter of the right frontal lobe.      Increased associated T2 and FLAIR signal are seen.  The findings may indicate a     small focus of subacute ischemic change.  Findings secondary to T2 shine through    could also have this appearance.  2. Evidence for small multifocal intracranial     metastatic foci along the inferior margin of the right frontal lobe as well as      the peripheral medial posterior margin of the left cerebellum.  3. Nonspecific     white matter signal changes are observed likely due to chronic small vessel     ischemic changes and age related changes.  Associated diffuse volume loss is     noted.      MOHAMUD GARRETT MD       Electronically Signed and Approved By: MOHAMUD GARRETT MD    on 1/10/2020 at 8:18               PET/CT 1/13/20      CONCLUSION:         1. No significant change in size of a right perihilar/mediastinal mass with     hypermetabolic activity consistent with malignancy.  The mass obstructs the     right upper and right middle lobe bronchi with associated atelectasis, which is     also unchanged.      2. No evidence of metastatic disease outside the chest.      3. Chronic changes as described above.            Cycle 1 of carboplatin plus etoposide on 1/22/2020.  Added Tecentriq with cycle     2 on 2/4/2020.  Completed 4 cycles of chemotherapy before continuing Tecentriq     immunotherapy alone.            Cycle 5 of Tecentriq alone on 6/30/2020.  The plan is to continue this every 3     weeks until progression or intolerance.            Completed whole brain radiation and consolidative right lung radiation on     4/21/2020.            CT of the chest, abdomen, and pelvis on 6/25/2020: Significant improvement in     the right paratracheal and right hilar mass.  For example the right paratracheal    mass is 1.7 cm in greatest dimension and was previously 3.6 x 5.4 cm.  The right    upper lobe and right middle lobe are now normally aerated.  There are no new     pulmonary nodules or masses.  There is no metastatic disease in the abdomen     pelvis.  Patient does have incidental findings of emphysema and coronary     atherosclerosis.            HPI - Oncology Interim      Patient comes in today to follow-up on her recent CT scan.  I shared with her     that there is continued improvement.  There is been a significant improvement in    her pulmonary  nodules and lung aeration since last scan.  She is tolerating     treatment very well.  She is gained about 1 pound this week.  She is eating well    and has no nausea or GI upset.  She says she feels well overall and she is no     longer using any oxygen.            Clinical Trial Participant      No            ECOG Performance Status      0            Most Recent Lab Findings      Laboratory Tests      6/30/20 09:40            PAST, FAMILY   Past Medical History      Past Medical History:  COPD, High Cholesterol, Hypertension, Short of Air      Hematology/Oncology (F):  Lung Cancer            Past Surgical History      None            Family History      Family History:  No Family History            Social History      Marital Status:        Number of Children:  3            Tobacco Use      Tobacco status:  Former smoker      Smoking history:  25-50 pack years            Alcohol Use      Alcohol intake:  0-2 drinks per day            Substance Use      Substance use:  Denies use            REVIEW OF SYSTEMS      General:  Admits: Fatigue, Weight Gain;          Denies: Appetite Change, Fever, Night Sweats, Weight Loss      Eye:  Denies Blurred Vision, Denies Corrective Lenses, Denies Diplopia, Denies     Vision Changes      ENT:  Denies Headache, Denies Hearing Loss, Denies Hoarseness, Denies Sore     Throat      Cardiovascular:  Denies Chest Pain, Denies Palpitations      Respiratory:  Denies: Cough, Coughing Blood, Productive Cough, Shortness of Air,    Wheezing      Gastrointestinal:  Denies Bloody Stools, Denies Constipation, Denies Diarrhea,     Denies Nausea/Vomiting, Denies Problem Swallowing, Denies Unable to Control     Bowels      Genitourinary:  Denies Blood in Urine, Denies Incontinence, Denies Painful     Urination      Musculoskeletal:  Denies Back Pain, Denies Muscle Pain, Denies Painful Joints      Integumentary:  Denies Itching, Denies Lesions, Denies Rash      Neurologic:  Denies Dizziness,  Denies Numbness\Tingling, Denies Seizures      Psychiatric:  Denies Anxiety, Denies Depression      Endocrine:  Denies Cold Intolerance, Denies Heat Intolerance      Hematologic/Lymphatic:  Denies Bruising, Denies Bleeding, Denies Enlarged Lymph     Nodes      Reproductive:  Denies: Menopause, Heavy Periods, Pregnant, Still Menstruating            VITAL SIGNS AND SCORES      Pain Score      Experiencing any pain?:  No      Pain Scale Used:  Numerical      Pain Intensity:  0            Fatigue Score      Experiencing any fatigue?:  Yes      Fatigue (0-10 scale):  9            EXAM      General: Alert, cooperative, no acute distress      Eyes: Anicteric sclera, PERRLA      HEENT: Oropharynx clear, no exudates      Respiratory: CTAB, normal respiratory effort      Abdomen: Normal active bowel sounds, no tenderness, no distention      Cardiovascular: RRR, no murmur, no peripheral edema      Skin: Normal tone, no rash, no lesions      Psychiatric: Appropriate affect, intact judgment      Neurologic: No focal sensory or motor deficits, no weakness, numbness, dizziness      Musculoskeletal: Normal muscle strength, normal muscle tone      Extremities: No clubbing, cyanosis, or deformities            PREVENTION      Hx Influenza Vaccination:  Yes      Date Influenza Vaccine Given:  Oct 1, 2018      Influenza Vaccine Declined:  No      2 or More Falls Past Year?:  No      Fall Past Year with Injury?:  No      Hx Pneumococcal Vaccination:  Yes      Encouraged to follow-up with:  PCP regarding preventative exams.      Chart initiated by      PEPE WALDRON MA            ALLERGY/MEDS      Allergies      Coded Allergies:             NO KNOWN ALLERGIES (Unverified , 6/30/20)            Medications      Last Reconciled on 7/13/20 00:21 by MELCHOR ENCINAS      Prochlorperazine Maleate (Prochlorperazine Maleate) 10 Mg Tab      10 MG PO Q6H PRN for NAUSEA AND/OR VOMITING, #60 TAB 3 Refills         Prov: MELCHOR ENCINAS         1/13/20        Ondansetron Odt (ONDANSETRON ODT) 8 Mg Tab.rapdis      8 MG PO Q8H PRN for NAUSEA, #30 TAB.RAPDIS 3 Refills         Prov: MELCHOR ENCINAS         1/13/20       HYDROcodone-Acetaminophen 5-325 Mg (HYDROcodone-Acetaminophen 5-325 Mg) 1 Each     Tablet      1-2 TAB PO Q4H for Post Surgical PAin, #8 TAB 0 Refills         Prov: Rick Sheets         1/9/20       Fluticasone (Flovent HFA) 220 Mcg Inhaler      2 PUFF INH RTBID, #1 INH 6 Refills         Prov: [employer]         1/7/20       amLODIPine (amLODIPine) 5 Mg Tablet      5 MG PO QDAY         Reported         1/2/20       Pravastatin Sodium (Pravastatin Sodium) 10 Mg Tablet      10 MG PO QDAY, TAB         Reported         7/23/15       Losartan/Hydrochlorothiazide (Losartan/Hctz 100/25 Mg) 1 Tab Tablet      1 TAB PO QDAY, #30 TAB 0 Refills         Reported         7/23/15       MDI-Albuterol (Proventil HFA) 60 Puffs/Inh Puffs      2 PUFFS INH Q4-6HPRN         Reported         3/1/13       Aspirin EC (Aspirin EC) 81 Mg Tabec      81 MG PO QDAY         Reported         3/1/13      Medications Reviewed:  No Changes made to meds            IMPRESSION/PLAN      Impression      75-year-old female with extensive stage small cell lung cancer who is here for     continued immunotherapy.            Diagnosis      Lung cancer - C34.90            Notes      New Diagnostics      * Brain MRI W/ Cont, 5 Week         Dx: Lung cancer - C34.90            Plan      Extensive stage was a lung cancer: Patient completed 4 cycles of carboplatin and    etoposide with Tecentriq.  She also completed consolidative radiation to the     chest and brain.  She comes in today for the fifth cycle of maintenance     Tecentriq without chemotherapy.  She states she was scheduled for a brain MRI     and came in for the image on June 22 but found out that it was not actually     ordered.  Following her visit today I can touch with Dr. Cheung who told me it     would be best to wait another 6  weeks to do the brain MRI post radiation.  I     will order this and have her follow-up in clinic after.  She can skip seeing me     in clinic with the next immunotherapy treatment.  We will also contact her     oxygen supplier and tell them she no longer needs supplemental oxygen supplies.            Elevated TSH: I reviewed the patient's labs and they are adequate to proceed to     chemotherapy.  Her TSH is very mildly elevated at 4.69 but the T4 is normal.  We    will continue to monitor with every other cycle.            Patient Education      Patient Education Provided:  Yes            Electronically signed by MELCHOR ENCINAS  07/13/2020 00:21       Disclaimer: Converted document may not contain table formatting or lab diagrams. Please see [employer] for the authenticated document.

## 2021-05-28 NOTE — PROGRESS NOTES
Patient: ANGEL DE JESUS     Acct: [id number]     Report: #[id number]  UNIT #: [id number]     : 1945    Encounter Date:2020  PRIMARY CARE: Heydi Julian  ***Signed***  --------------------------------------------------------------------------------------------------------------------  NURSE INTAKE      Visit Type      Established Patient Visit            Chief Complaint      LUNG CANCER (HERE FOR TX)            Referring Provider/Copies To      [facility]      Copies To:   Heydi Julian            History and Present Illness      Past Oncology Illness History      Ms. De Jesus is a 75 yo F with a new diagnosis of small cell lung cancer.  She was    a heavy smoker until last week when she came into the hospital with shortness of    breath. Initial chest x-ray was concerning for a mass.  She then had a CT chest     on 20 which showed a mass at least 5.1cm involving the right hilum, right     paratracheal and       subcarinal regions causing encasement and narrowing of the right main bronchus     and right middle lobe bronchus and occlusion of the right upper lobe bronchus     with subsequent complete atelectasis of the right upper and right middle lobes.     Of note, there is a large hiatal hernia with gastric volvulus but no evidence of    gastric outlet obstruction.  Biopsy on 1/3/20 was positive for small cell lung     cancer.              Brain MRI 1/10/20:         1. There is an apparent focus of small possible abnormal diffusion restriction     within the subcortical or deep white matter of the right frontal lobe.      Increased associated T2 and FLAIR signal are seen.  The findings may indicate a     small focus of subacute ischemic change.  Findings secondary to T2 shine through    could also have this appearance.  2. Evidence for small multifocal intracranial     metastatic foci along the inferior margin of the right frontal lobe as well as      the peripheral medial posterior margin of the left cerebellum.  3. Nonspecific     white matter signal changes are observed likely due to chronic small vessel     ischemic changes and age related changes.  Associated diffuse volume loss is     noted.      MOHAMUD GARRETT MD       Electronically Signed and Approved By: MOHAMUD GARRETT MD    on 1/10/2020 at 8:18               PET/CT 1/13/20      CONCLUSION:         1. No significant change in size of a right perihilar/mediastinal mass with     hypermetabolic activity consistent with malignancy.  The mass obstructs the     right upper and right middle lobe bronchi with associated atelectasis, which is     also unchanged.      2. No evidence of metastatic disease outside the chest.      3. Chronic changes as described above.            Cycle 1 of carboplatin plus etoposide on 1/22/2020      Cycle 2 of carboplatin plus etoposide with Tecentriq on 2/4/2020      Cycle 3 of carboplatin plus etoposide with Tecentriq on 2/25/2020      Cycle 4 of carboplatin plus etoposide with Tecentriq on 3/17/2020      Cycle 1 of Tecentriq alone on 4/7/2020.  The plan is to continue this every 3 we    eks until progression or intolerance.            Completed whole brain radiation and consolidative right lung radiation on     4/21/2020.            HPI - Oncology Interim      Patient comes in today for her third cycle of Tecentriq alone.  This is her     seventh overall cycle of treatment.  She tolerated chemoradiation very well.      Today she is really not had any significant side effects.            Clinical Trial Participant      No            ECOG Performance Status      1            Most Recent Lab Findings      Laboratory Tests      5/19/20 09:40            PAST, FAMILY   Past Medical History      Past Medical History:  COPD, High Cholesterol, Hypertension, Short of Air      Hematology/Oncology (F):  Lung Cancer            Past Surgical History      None            Family History       Family History:  No Family History            Social History      Marital Status:        Number of Children:  3            Tobacco Use      Tobacco status:  Former smoker      Smoking history:  25-50 pack years            Alcohol Use      Alcohol intake:  0-2 drinks per day            Substance Use      Substance use:  Denies use            REVIEW OF SYSTEMS      General:  Admits: Fatigue;          Denies: Appetite Change, Fever, Night Sweats, Weight Gain, Weight Loss      Eye:  Denies Blurred Vision, Denies Corrective Lenses, Denies Diplopia, Denies     Vision Changes      ENT:  Denies Headache, Denies Hearing Loss, Denies Hoarseness, Denies Sore     Throat      Cardiovascular:  Denies Chest Pain, Denies Palpitations      Respiratory:  Denies: Cough, Coughing Blood, Productive Cough, Shortness of Air,    Wheezing      Gastrointestinal:  Denies Bloody Stools, Denies Constipation, Denies Diarrhea,     Denies Nausea/Vomiting, Denies Problem Swallowing, Denies Unable to Control     Bowels      Genitourinary:  Denies Blood in Urine, Denies Incontinence, Denies Painful     Urination      Musculoskeletal:  Denies Back Pain, Denies Muscle Pain, Denies Painful Joints      Integumentary:  Denies Itching, Denies Lesions, Denies Rash      Neurologic:  Denies Dizziness, Denies Numbness\Tingling, Denies Seizures      Psychiatric:  Denies Anxiety, Denies Depression      Endocrine:  Denies Cold Intolerance, Denies Heat Intolerance      Hematologic/Lymphatic:  Denies Bruising, Denies Bleeding, Denies Enlarged Lymph     Nodes      Reproductive:  Denies: Menopause, Heavy Periods, Pregnant, Still Menstruating            VITAL SIGNS AND SCORES      Vitals      Height 4 ft 11.49 in / 151.1 cm      Weight 123 lbs 7.322 oz / 56 kg      BSA 1.51 m2      BMI 24.5 kg/m2      Temperature 97.2 F / 36.22 C - Temporal      Pulse 92      Respirations 16      Blood Pressure 118/68 Sitting      Pulse Oximetry 97%, ROOM AIR            Pain  Score      Experiencing any pain?:  No      Pain Scale Used:  Numerical      Pain Intensity:  0            Fatigue Score      Experiencing any fatigue?:  Yes      Fatigue (0-10 scale):  2            EXAM      General: Alert, cooperative, no acute distress, well-appearing      Eyes: Anicteric sclera, PERRLA      HEENT: Oropharynx clear, no exudates      Respiratory: CTAB, normal respiratory effort      Abdomen: Normal active bowel sounds, no tenderness, no distention      Cardiovascular: RRR, no murmur, no peripheral edema      Skin: Normal tone, no rash, no lesions      Psychiatric: Appropriate affect, intact judgment      Neurologic: No focal sensory or motor deficits, no weakness, numbness, dizziness      Musculoskeletal: Normal muscle strength, normal muscle tone      Extremities: No clubbing, cyanosis, or deformities            PREVENTION      Hx Influenza Vaccination:  Yes      Date Influenza Vaccine Given:  Oct 1, 2018      Influenza Vaccine Declined:  No      2 or More Falls Past Year?:  No      Fall Past Year with Injury?:  No      Hx Pneumococcal Vaccination:  Yes      Encouraged to follow-up with:  PCP regarding preventative exams.      Chart initiated by      ALBINO CORDOVA CMA            ALLERGY/MEDS      Allergies      Coded Allergies:             NO KNOWN ALLERGIES (Unverified , 5/19/20)            Medications      Last Reconciled on 5/25/20 21:47 by MELCHOR ENCINAS      Prochlorperazine Maleate (Prochlorperazine Maleate) 10 Mg Tab      10 MG PO Q6H PRN for NAUSEA AND/OR VOMITING, #60 TAB 3 Refills         Prov: MELCHOR ENCINAS         1/13/20       Ondansetron Odt (ONDANSETRON ODT) 8 Mg Tab.rapdis      8 MG PO Q8H PRN for NAUSEA, #30 TAB.RAPDIS 3 Refills         Prov: MELCHOR ENCINAS         1/13/20       HYDROcodone-Acetaminophen 5-325 Mg (HYDROcodone-Acetaminophen 5-325 Mg) 1 Each     Tablet      1-2 TAB PO Q4H for Post Surgical PAin, #8 TAB 0 Refills         Prov: Rick Sheets         1/9/20        predniSONE (predniSONE) 20 Mg Tablet      40 MG PO QDAY, TAB 0 Refills         Reported         1/8/20       Fluticasone (Flovent HFA) 220 Mcg Inhaler      2 PUFF INH RTBID, #1 INH 6 Refills         Prov: Tank Maya         1/7/20       amLODIPine (amLODIPine) 5 Mg Tablet      5 MG PO QDAY         Reported         1/2/20       Pravastatin Sodium (Pravastatin Sodium) 10 Mg Tablet      10 MG PO QDAY, TAB         Reported         7/23/15       Losartan/Hydrochlorothiazide (Losartan/Hctz 100/25 Mg) 1 Tab Tablet      1 TAB PO QDAY, #30 TAB 0 Refills         Reported         7/23/15       MDI-Albuterol (Proventil HFA) 60 Puffs/Inh Puffs      2 PUFFS INH Q4-6HPRN         Reported         3/1/13       Omega-3 Fatty Acids/Fish Oil (Fish Oil 1000 Mg) 1,000 Mg Capsule      1000 MG PO QDAY, CAP         Reported         3/1/13       Aspirin EC (Aspirin EC) 81 Mg Tabec      81 MG PO QDAY         Reported         3/1/13      Medications Reviewed:  No Changes made to meds            IMPRESSION/PLAN      Impression      75-year-old female with extensive stage small cell lung cancer currently on     immunotherapy            Plan      Extensive stage small cell lung cancer: Patient is here today for her third     cycle of Tecentriq maintenance therapy.  She completed chemotherapy and     radiation including whole brain radiation.  Patient will get restaging CT scans     including CT chest, abdomen, and pelvis prior to her next appointment.  If she     does not already have an MRI of the brain scheduled by radiation oncology she     should have that as well around July of this year.            Patient Education      Patient Education Provided:  Yes            Electronically signed by MELCHOR ENCINAS  05/25/2020 21:47       Disclaimer: Converted document may not contain table formatting or lab diagrams. Please see [employer] [facility] for the authenticated document.

## 2021-05-28 NOTE — PROGRESS NOTES
Patient: ANGEL DE JESUS     Acct: [id number]     Report: #[id number]  UNIT #: [id number]     : 1945    Encounter Date:2021  PRIMARY CARE: Heydi Julian  ***Signed***  --------------------------------------------------------------------------------------------------------------------  NURSE INTAKE      Visit Type      Established Patient Visit            Chief Complaint      LUNG CA TX            Referring Provider/Copies To      Primary Care Provider:  SARABJIT GARCIA      Copies To:   SARABJIT GARCIA            History and Present Illness      Past Oncology Illness History      Ms. De Jesus is a 74 yo F with a new diagnosis of small cell lung cancer.  She was    a heavy smoker until last week when she came into the hospital with shortness of    breath. Initial chest x-ray was concerning for a mass.  She then had a CT chest     on 20 which showed a mass at least 5.1cm involving the right hilum, right     paratracheal and       subcarinal regions causing encasement and narrowing of the right main bronchus     and right middle lobe bronchus and occlusion of the right upper lobe bronchus     with subsequent complete atelectasis of the right upper and right middle lobes.     Of note, there is a large hiatal hernia with gastric volvulus but no evidence of    gastric outlet obstruction.  Biopsy on 1/3/20 was positive for small cell lung     cancer.              Brain MRI 1/10/20:         1. There is an apparent focus of small possible abnormal diffusion restriction     within the subcortical or deep white matter of the right frontal lobe.      Increased associated T2 and FLAIR signal are seen.  The findings may indicate a     small focus of subacute ischemic change.  Findings secondary to T2 shine through    could also have this appearance.  2. Evidence for small multifocal intracranial     metastatic foci along the inferior margin of the right frontal lobe as well as     the peripheral medial  posterior margin of the left cerebellum.  3. Nonspecific     white matter signal changes are observed likely due to chronic small vessel     ischemic changes and age related changes.  Associated diffuse volume loss is     noted.      MOHAMUD GARRETT MD       Electronically Signed and Approved By: MOHAMUD GARRETT MD    on 1/10/2020 at 8:18               PET/CT 1/13/20      CONCLUSION:         1. No significant change in size of a right perihilar/mediastinal mass with     hypermetabolic activity consistent with malignancy.  The mass obstructs the     right upper and right middle lobe bronchi with associated atelectasis, which is     also unchanged.      2. No evidence of metastatic disease outside the chest.      3. Chronic changes as described above.            Cycle 1 of carboplatin plus etoposide on 1/22/2020.  Added Tecentriq with cycle     2 on 2/4/2020.  Completed 4 cycles of chemotherapy before continuing Tecentriq     immunotherapy alone.            Completed whole brain radiation and consolidative right lung radiation on     4/21/2020.            CT of the chest, abdomen, and pelvis on 6/25/2020: Significant improvement in     the right paratracheal and right hilar mass.  For example the right paratracheal    mass is 1.7 cm in greatest dimension and was previously 3.6 x 5.4 cm.  The right    upper lobe and right middle lobe are now normally aerated.  There are no new     pulmonary nodules or masses.  There is no metastatic disease in the abdomen     pelvis.  Patient does have incidental findings of emphysema and coronary     atherosclerosis.            On Tecentriq maintenance every 3 weeks until progression or intolerance.             PET/CT on 12/7/2020: Overall the scan shows further improvement with resolution     of the previous right hilar and mediastinal masses.  There are a few areas of     focus around the L4 vertebral body and left femoral head which we will continue     2 monitor.  The patient is  currently asymptomatic.            HPI - Oncology Interim      Patient comes in today for her next cycle of Tecentriq.  This is cycle 21.  She     had a recent CT scan on 3/8/2021 which shows no evidence of disease.  She has no    new complaints today.  She is able to all of her own activities.            Clinical Trial Participant      No            ECOG Performance Status      1            Most Recent Lab Findings      Laboratory Tests      3/23/21 10:27            Most Recent Imaging Findings      CT ABDOMEN; CT CHEST; CT PELVIS WITH CONTRAST             COMPARISON:   [facility], CT, CT CHEST ABD PEL W CONTRAST,     6/25/2020, 10:41.             INDICATIONS:   LEFT LUNG CA, BRAIN METS             TECHNIQUE:   After obtaining the patient's consent, CT images were obtained with    intravenous contrast       material.             FINDINGS:         Chest:  Emphysema.  No dense consolidation or suspicious pulmonary nodule.  The     previously       demonstrated right paratracheal thickening and hilar thickening are less     apparent.  No       pathologically enlarged lymph nodes in the chest.  No aggressive appearing bone     change.             Abdomen:  Large hiatal hernia.  Liver, spleen, pancreas, and gallbladder are     unremarkable.  Small       renal cysts and nonobstructing calculi are similar to the previous study.  Low-    attenuation adrenal       fullness is stable.  Bowel loops are nondilated.  The appendix is normal.             Pelvis:  No pelvic mass or fluid.  Rim calcified cyst in the left adnexa is     unchanged.  No       aggressive appearing bone change.             CONCLUSION:   The previously demonstrated thickening along the right     paratracheal and hilar regions       is much less apparent.             No evidence for disease progression in the chest.  No convincing evidence for     active metastatic       disease in the abdomen or pelvis.              DANIEL ROWE MD              Electronically Signed and Approved By: DANIEL ROWE MD on 3/08/2021 at 11:03            PAST, FAMILY   Past Medical History      Past Medical History:  COPD, High Cholesterol, Hypertension, Short of Air      Hematology/Oncology (F):  Lung Cancer            Past Surgical History      None            Family History      Family History:  No Family History            Social History      Number of Children:  3            Tobacco Use      Tobacco status:  Former smoker            Substance Use      Substance use:  Denies use            REVIEW OF SYSTEMS      General:  Denies: Appetite Change, Fatigue, Fever, Night Sweats, Weight Gain,     Weight Loss      Eye:  Denies Blurred Vision, Denies Corrective Lenses, Denies Diplopia, Denies     Vision Changes      ENT:  Denies Headache; Admits Hearing Loss; Denies Hoarseness, Denies Sore     Throat      Cardiovascular:  Denies Chest Pain, Denies Palpitations      Respiratory:  Denies: Cough, Coughing Blood, Productive Cough, Shortness of Air,    Wheezing      Gastrointestinal:  Denies Bloody Stools, Denies Constipation, Denies Diarrhea,     Denies Nausea/Vomiting, Denies Problem Swallowing, Denies Unable to Control     Bowels      Genitourinary:  Denies Blood in Urine, Denies Incontinence, Denies Painful     Urination      Musculoskeletal:  Denies Back Pain, Denies Muscle Pain, Denies Painful Joints      Integumentary:  Denies Itching, Denies Lesions, Denies Rash      Neurologic:  Admits Dizziness; Denies Numbness\Tingling, Denies Seizures      Psychiatric:  Denies Anxiety, Denies Depression      Endocrine:  Denies Cold Intolerance, Denies Heat Intolerance            VITAL SIGNS AND SCORES      Vitals      Weight 119 lbs 0.774 oz / 54.0 kg      Temperature 96.3 F / 35.72 C - Temporal      Pulse 67      Respirations 20      Blood Pressure 148/81 Sitting      Pulse Oximetry 100%, RM AIR            Pain Score      Experiencing any pain?:  No      Pain Intensity:  0             Fatigue Score      Experiencing any fatigue?:  No      Fatigue (0-10 scale):  0 (none)            EXAM      General: Alert, cooperative, no acute distress, well appearing      Eyes: Anicteric sclera, PERRLA      Respiratory: CTAB, normal respiratory effort      Abdomen: Normal active bowel sounds, no tenderness, no distention      Cardiovascular: RRR, no murmur, no lower extremity edema      Skin: Normal tone, no rash, no lesions      Psychiatric: Appropriate affect, intact judgment      Neurologic: No focal sensory or motor deficits, no weakness, numbness, dizziness      Musculoskeletal: Normal muscle strength and tone      Extremities: No clubbing, cyanosis, or deformities            PREVENTION      Hx Influenza Vaccination:  Yes      Date Influenza Vaccine Given:  Sep 18, 2020      Influenza Vaccine Declined:  No      2 or More Falls in Past Year?:  No      Fall Past Year with Injury?:  No      Hx Pneumococcal Vaccination:  Yes      Encouraged to follow-up with:  PCP regarding preventative exams.      Chart initiated by      KENYATTA LARA MA            ALLERGY/MEDS      Allergies      Coded Allergies:             NO KNOWN ALLERGIES (Unverified , 3/23/21)            Medications      Last Reconciled on 3/7/21 20:55 by MELCHOR ENCINAS      Prochlorperazine Maleate (Prochlorperazine Maleate) 10 Mg Tab      10 MG PO Q6H PRN for NAUSEA AND/OR VOMITING, #60 TAB 3 Refills         Prov: MELCHOR ENCINAS         1/13/20       Ondansetron Odt (ONDANSETRON ODT) 8 Mg Tab.rapdis      8 MG PO Q8H PRN for NAUSEA, #30 TAB.RAPDIS 3 Refills         Prov: MELCHOR ENCINAS         1/13/20       HYDROcodone-Acetaminophen 5-325 Mg (HYDROcodone-Acetaminophen 5-325 Mg) 1 Each     Tablet      1-2 TAB PO Q4H for Post Surgical PAin, #8 TAB 0 Refills         Prov: Rick Sheets         1/9/20       Fluticasone (Flovent HFA) 220 Mcg Inhaler      2 PUFF INH RTBID, #1 INH 6 Refills         Prov: Tank Maya         1/7/20       amLODIPine  (amLODIPine) 5 Mg Tablet      5 MG PO QDAY         Reported         1/2/20       Pravastatin Sodium (Pravastatin Sodium) 10 Mg Tablet      10 MG PO QDAY, TAB         Reported         7/23/15       Losartan/Hydrochlorothiazide (Losartan/Hctz 100/25 Mg) 1 Tab Tablet      1 TAB PO QDAY, #30 TAB 0 Refills         Reported         7/23/15       MDI-Albuterol (Proventil HFA) 60 Puffs/Inh Puffs      2 PUFFS INH Q4-6HPRN         Reported         3/1/13       Aspirin EC (Aspirin EC) 81 Mg Tabec      81 MG PO QDAY         Reported         3/1/13      Medications Reviewed:  No Changes made to meds            IMPRESSION/PLAN      Plan      Extensive stage small cell lung cancer with CNS metastasis: Patient completed 4     cycles of carboplatin and etoposide with Tecentriq.  She is here today for her     next cycle of maintenance Tecentriq, C21.  CT CAP on 3/8/2021 showed no evidence    of disease.  CNS metastasis was treated with whole brain radiation therapy by Dr Cheung.  She has no complaints or concerns on her labs are all within normal     limits.  She will proceed to treatment today and continue on the same treatment     schedule.  I will continue seeing her in clinic every other visit.            Patient Education      Patient Education Provided:  Yes            Electronically signed by MELCHOR ENCINAS  05/14/2021 14:04       Disclaimer: Converted document may not contain table formatting or lab diagrams. Please see [employer] [facility] for the authenticated document.

## 2021-05-28 NOTE — PROGRESS NOTES
Patient: ANGEL DE JESUS     [id number]     Report: #[id number]  UNIT #: [id number]     : 1945    Encounter Date:2021  PRIMARY CARE: Heydi Julian  ***Signed***  --------------------------------------------------------------------------------------------------------------------  NURSE INTAKE      Visit Type      Established Patient Visit            Chief Complaint      SCLC            Referring Provider/Copies To      Primary Care Provider:  SARABJIT GARCIA      Copies To:   SARABJIT GARCIA            History and Present Illness      Past Oncology Illness History      Ms. De Jesus is a 74 yo F with a new diagnosis of small cell lung cancer.  She was    a heavy smoker until last week when she came into the hospital with shortness of    breath. Initial chest x-ray was concerning for a mass.  She then had a CT chest     on 20 which showed a mass at least 5.1cm involving the right hilum, right     paratracheal and       subcarinal regions causing encasement and narrowing of the right main bronchus     and right middle lobe bronchus and occlusion of the right upper lobe bronchus     with subsequent complete atelectasis of the right upper and right middle lobes.     Of note, there is a large hiatal hernia with gastric volvulus but no evidence of    gastric outlet obstruction.  Biopsy on 1/3/20 was positive for small cell lung     cancer.              Brain MRI 1/10/20:         1. There is an apparent focus of small possible abnormal diffusion restriction     within the subcortical or deep white matter of the right frontal lobe.      Increased associated T2 and FLAIR signal are seen.  The findings may indicate a     small focus of subacute ischemic change.  Findings secondary to T2 shine through    could also have this appearance.  2. Evidence for small multifocal intracranial     metastatic foci along the inferior margin of the right frontal lobe as well as     the peripheral medial  posterior margin of the left cerebellum.  3. Nonspecific     white matter signal changes are observed likely due to chronic small vessel     ischemic changes and age related changes.  Associated diffuse volume loss is     noted.      MOHAMUD GARRETT MD       Electronically Signed and Approved By: MOHAMUD GARRETT MD    on 1/10/2020 at 8:18               PET/CT 1/13/20      CONCLUSION:         1. No significant change in size of a right perihilar/mediastinal mass with     hypermetabolic activity consistent with malignancy.  The mass obstructs the     right upper and right middle lobe bronchi with associated atelectasis, which is     also unchanged.      2. No evidence of metastatic disease outside the chest.      3. Chronic changes as described above.            Cycle 1 of carboplatin plus etoposide on 1/22/2020.  Added Tecentriq with cycle     2 on 2/4/2020.  Completed 4 cycles of chemotherapy before continuing Tecentriq     immunotherapy alone.            Completed whole brain radiation and consolidative right lung radiation on     4/21/2020.            CT of the chest, abdomen, and pelvis on 6/25/2020: Significant improvement in th    e right paratracheal and right hilar mass.  For example the right paratracheal     mass is 1.7 cm in greatest dimension and was previously 3.6 x 5.4 cm.  The right    upper lobe and right middle lobe are now normally aerated.  There are no new     pulmonary nodules or masses.  There is no metastatic disease in the abdomen     pelvis.  Patient does have incidental findings of emphysema and coronary     atherosclerosis.            On Tecentriq maintenance every 3 weeks until progression or intolerance.             PET/CT on 12/7/2020: Overall the scan shows further improvement with resolution     of the previous right hilar and mediastinal masses.  There are a few areas of     focus around the L4 vertebral body and left femoral head which we will continue     2 monitor.  The patient is  currently asymptomatic.            HPI - Oncology Interim      Patient is here to day for her next cycle of Tecentriq.  She is tolerating     treatment very well.  She says she feels great and is gained 2 pounds.  She saw     Dr. Cheung last week.  She had an MRI on 11/16/2020.  I reviewed the results of    this.  She will follow up with Dr. Alonzo in April.            Clinical Trial Participant      No            ECOG Performance Status      1            PAST, FAMILY   Past Medical History      Past Medical History:  COPD, High Cholesterol, Hypertension, Short of Air      Hematology/Oncology (F):  Lung Cancer            Past Surgical History      None            Family History      Family History:  No Family History            Social History      Number of Children:  3            Tobacco Use      Tobacco status:  Former smoker            Substance Use      Substance use:  Denies use            REVIEW OF SYSTEMS      General:  Admits: Fatigue;          Denies: Appetite Change, Fever, Night Sweats, Weight Gain, Weight Loss      Eye:  Denies Blurred Vision, Denies Corrective Lenses, Denies Diplopia, Denies     Vision Changes      ENT:  Denies Headache, Denies Hearing Loss, Denies Hoarseness, Denies Sore     Throat      Cardiovascular:  Denies Chest Pain, Denies Palpitations      Respiratory:  Admits: Shortness of Air;          Denies: Cough, Coughing Blood, Productive Cough, Wheezing      Gastrointestinal:  Denies Bloody Stools, Denies Constipation, Denies Diarrhea,     Denies Nausea/Vomiting, Denies Problem Swallowing, Denies Unable to Control     Bowels      Genitourinary:  Denies Blood in Urine, Denies Incontinence, Denies Painful     Urination      Musculoskeletal:  Denies Back Pain, Denies Muscle Pain, Denies Painful Joints      Integumentary:  Denies Itching, Denies Lesions, Denies Rash      Neurologic:  Denies Dizziness, Denies Numbness\Tingling, Denies Seizures      Psychiatric:  Denies Anxiety, Denies  Depression      Endocrine:  Denies Cold Intolerance, Denies Heat Intolerance      Hematologic/Lymphatic:  Denies Bruising, Denies Bleeding, Denies Enlarged Lymph     Nodes      Reproductive:  Denies: Menopause, Heavy Periods, Pregnant, Still Menstruating            VITAL SIGNS AND SCORES      Vitals      Weight 121 lbs 4.048 oz / 55 kg      Temperature 98 F / 36.67 C - Temporal      Pulse 82      Respirations 18      Blood Pressure 117/87 Sitting      Pulse Oximetry 100%, RM AIR            Pain Score      Experiencing any pain?:  No      Pain Scale Used:  Numerical      Pain Intensity:  0            Fatigue Score      Experiencing any fatigue?:  Yes      Fatigue (0-10 scale):  2            EXAM      General: Alert, cooperative, no acute distress      Eyes: Anicteric sclera, PERRLA      Respiratory: CTAB, normal respiratory effort      Abdomen: Normal active bowel sounds, no tenderness, no distention      Cardiovascular: RRR, no murmur, no lower extremity edema      Skin: Normal tone, no rash, no lesions      Psychiatric: Appropriate affect, intact judgment      Neurologic: No focal sensory or motor deficits, no weakness, numbness, dizziness      Musculoskeletal: Normal muscle strength and tone      Extremities: No clubbing, cyanosis, or deformities            PREVENTION      Hx Influenza Vaccination:  Yes      Date Influenza Vaccine Given:  Sep 18, 2020      Influenza Vaccine Declined:  No      2 or More Falls in Past Year?:  No      Fall Past Year with Injury?:  No      Hx Pneumococcal Vaccination:  Yes      Encouraged to follow-up with:  PCP regarding preventative exams.      Chart initiated by      SARA CARDOZA MA            ALLERGY/MEDS      Allergies      Coded Allergies:             NO KNOWN ALLERGIES (Unverified , 1/26/21)            Medications      Last Reconciled on 3/7/21 20:55 by MELCHOR ENCINAS      Prochlorperazine Maleate (Prochlorperazine Maleate) 10 Mg Tab      10 MG PO Q6H PRN for NAUSEA AND/OR  VOMITING, #60 TAB 3 Refills         Prov: MELCHOR ENCINAS         1/13/20       Ondansetron Odt (ONDANSETRON ODT) 8 Mg Tab.rapdis      8 MG PO Q8H PRN for NAUSEA, #30 TAB.RAPDIS 3 Refills         Prov: MELCHOR ENCINAS         1/13/20       HYDROcodone-Acetaminophen 5-325 Mg (HYDROcodone-Acetaminophen 5-325 Mg) 1 Each     Tablet      1-2 TAB PO Q4H for Post Surgical PAin, #8 TAB 0 Refills         Prov: SheetsRick         1/9/20       Fluticasone (Flovent HFA) 220 Mcg Inhaler      2 PUFF INH RTBID, #1 INH 6 Refills         Prov: Tank Maya         1/7/20       amLODIPine (amLODIPine) 5 Mg Tablet      5 MG PO QDAY         Reported         1/2/20       Pravastatin Sodium (Pravastatin Sodium) 10 Mg Tablet      10 MG PO QDAY, TAB         Reported         7/23/15       Losartan/Hydrochlorothiazide (Losartan/Hctz 100/25 Mg) 1 Tab Tablet      1 TAB PO QDAY, #30 TAB 0 Refills         Reported         7/23/15       MDI-Albuterol (Proventil HFA) 60 Puffs/Inh Puffs      2 PUFFS INH Q4-6HPRN         Reported         3/1/13       Aspirin EC (Aspirin EC) 81 Mg Tabec      81 MG PO QDAY         Reported         3/1/13      Medications Reviewed:  No Changes made to meds            IMPRESSION/PLAN      Diagnosis      SCLC (small cell lung carcinoma) - C34.90            Notes      New Diagnostics      * CT Abd/Pelvis/Chest W/Contrast, 6 WEEKS         Dx: SCLC (small cell lung carcinoma) - C34.90            Plan      Extensive stage small cell lung cancer with CNS metastasis: Patient completed 4     cycles of carboplatin and etoposide with Tecentriq.  She is here today for her     next cycle of maintenance Tecentriq.  CNS metastasis was treated with whole     brain radiation therapy by Dr Cheung.  We will plan for her next restaging     scans in March.  She should have a brain MRI by that time as well.  I have     reviewed her labs and she will proceed to the next cycle of Tecentriq.  She will    continue to follow-up with me every  other cycle.            Patient Education      Patient Education Provided:  Yes            Electronically signed by MELCHOR ENCINAS  03/07/2021 20:55       Disclaimer: Converted document may not contain table formatting or lab diagrams. Please see [employer] for the authenticated document.

## 2021-05-28 NOTE — PROGRESS NOTES
"Patient: ANGEL MUNOZ     Acct: [id number]     Report: #[id number]  UNIT #: [id number]     : 1945    Encounter Date:2020  PRIMARY CARE: Heydi Julian  ***Signed***  --------------------------------------------------------------------------------------------------------------------  Chief Complaint      Encounter Date      2020            Primary Care Provider      HEYDI GARCIA            Referring Provider      SELF,REFERRED            Patient Complaint      Patient is complaining of      New pt here for lung mass            VITALS      Height 5 ft  / 152.4 cm      Weight 143 lbs 2.000 oz / 64.349885 kg      BSA 1.62 m2      BMI 28.0 kg/m2      Temperature 97.1 F / 36.17 C - Temporal      Pulse 115      Respirations 20      Blood Pressure 139/72 Sitting, Left Arm      Pulse Oximetry 87%, room air      Comment: Rechecked Oxygen on 2 liters  93%            pulse 110            B/P  113/65            HPI      The patient is a very pleasant but unfortunate 74 year old female here today for    follow up bronchoscopy results.             The patient underwent bronchoscopy last week by Dr. Duong for right upper lobe    lung mass that was positive for small cell lung cancer.  The patient is feeling     poorly today, she has had increased cough and increased shortness of breath. The    last time she used her nebulizer was this morning at 4:30. She does report     improvement in her symptoms of shortness of breath when she uses her nebulizer     but states they do sometimes make her \"jittery.\"  The patient is not currently     wearing any oxygen, her oxygen saturation today in the office is 87%. She feels     that her breathing has improved since being in the hospital but overall still     feels very winded. She denies having any chest pains, hemoptysis, fever or     chills at this time.            ROS      Constitutional:  Denies: Fatigue, Fever, Weight gain, Weight loss, Chills,   "   Insomnia, Other      Respiratory/Breathing:  Complains of: Shortness of air, Wheezing, Cough; Denies:    Hemoptysis, Pleuritic pain, Other      Endocrine:  Denies: Polydipsia, Polyuria, Heat/cold intolerance, Abnorml men    strual pattern, Diabetes, Other      Eyes:  Denies: Blurred vision, Vision Changes, Other      Ears, nose, mouth, throat:  Denies: Mouth lesions, Thrush, Throat pain,     Hoarseness, Allergies/Hay Fever, Post Nasal Drip, Headaches, Recent Head Injury,    Nose Bleeding, Neck Stiffness, Thyroid Mass, Hearing Loss, Ear Fullness, Dry     Mouth, Nasal or Sinus Pain, Dry Lips, Nasal discharge, Nasal congestion, Other      Cardiovascular:  Denies: Palpitations, Syncope, Claudication, Chest Pain, Wake     up Gasping for air, Leg Swelling, Irregular Heart Rate, Cyanosis, Dyspnea on     Exertion, Other      Gastrointestinal:  Denies: Nausea, Constipation, Diarrhea, Abdominal pain,     Vomiting, Difficulty Swallowing, Reflux/Heartburn, Dysphagia, Jaundice,     Bloating, Melena, Bloody stools, Other      Genitourinary:  Denies: Urinary frequency, Incontinence, Hematuria, Urgency,     Nocturia, Dysuria, Testicular problems, Other      Musculoskeletal:  Denies: Joint Pain, Joint Stiffness, Joint Swelling, Myalgias,    Other      Hematologic/lymphatic:  DENIES: Lymphadenopathy, Bruising, Bleeding tendencies,     Other      Neurological:  Denies: Headache, Numbness, Weakness, Seizures, Other      Psychiatric:  Denies: Anxiety, Appropriate Effect, Depression, Other      Sleep:  No: Excessive daytime sleep, Morning Headache?, Snoring, Insomnia?, Stop    breathing at sleep?, Other      Integumentary:  Denies: Rash, Dry skin, Skin Warm to Touch, Other      Immunologic/Allergic:  Denies: Latex allergy, Seasonal allergies, Asthma,     Urticaria, Eczema, Other      Immunization status:  No: Up to date            FAMILY/SOCIAL/MEDICAL HX      Surgical History:  No: Abdominal Surgery, Appendectomy, Bladder Surgery,  Bowel     Surgery, CABG, Cholecystectomy, Head Surgery, Oral Surgery, Orthopedic Surgery,     Vascular Surgery      Other Family Medical History:  Father      Is Father Still Living?:  No      Is Mother Still Living?:  No      Social History:  Tobacco Use; No Alcohol Use, No Recreational Drug use      Smoking status:  Former smoker (.5 ppd x 40y, quit 12/6/19)      Anticoagulation Therapy:  No      Antibiotic Prophylaxis:  No      Medical History:  Yes: Asthma (PRN INHALER USE), Chronic Bronchitis/COPD, High     Blood Pressure (MED CONTROLS), High Cholesterol, Shortness Of Breath; No:     Arthritis, Blood Disease, Chemotherapy/Cancer, Congestive Heart Failu, Deafness     or Ringing Ears, Diabetes, Seizures, Heart Attack, Hemorrhoids/Rectal Prob,     Miscellaneous Medical/oth      Psychiatric History      None            PREVENTION      Hx Influenza Vaccination:  Yes      Date Influenza Vaccine Given:  Oct 1, 2018      Influenza Vaccine Declined:  No      2 or More Falls Past Year?:  No      Fall Past Year with Injury?:  No      Hx Pneumococcal Vaccination:  Yes      Encouraged to follow-up with:  PCP regarding preventative exams.      Chart initiated by      Jennifer Wadsworth MA            ALLERGIES/MEDICATIONS      Allergies:        Coded Allergies:             NO KNOWN ALLERGIES (Unverified , 1/6/20)      Medications    Last Reconciled on 1/7/20 13:35 by LUCIANA GILBERT MD      predniSONE (predniSONE) 20 Mg Tablet      40 MG PO QDAY for 5 Days, #10 TAB 0 Refills         Prov: Abdirizak Lezama         1/4/20       Amoxicillin/Clavulanic Acid 875/125 (Augmentin 875/125) 1 Each Tablet      875 MG PO BID for 7 Days, #14 TAB 0 Refills         Prov: Abdirizak Lezama         1/4/20       amLODIPine (amLODIPine) 5 Mg Tablet      5 MG PO QDAY         Reported         1/2/20       Pravastatin Sodium (Pravastatin Sodium) 10 Mg Tablet      10 MG PO QDAY, TAB         Reported         7/23/15       Losartan/Hydrochlorothiazide  (Losartan/Hctz 100/25 Mg) 1 Tab Tablet      1 TAB PO QDAY, #30 TAB 0 Refills         Reported         7/23/15       MDI-Albuterol (Proventil HFA) 60 Puffs/Inh Puffs      2 PUFFS INH Q4-6HPRN         Reported         3/1/13       Omega-3 Fatty Acids/Fish Oil (Fish Oil 1000 Mg) 1,000 Mg Capsule      1000 MG PO QDAY, CAP         Reported         3/1/13       Aspirin EC (Aspirin EC) 81 Mg Tabec      81 MG PO QDAY         Reported         3/1/13      Current Medications      Current Medications Reviewed 1/6/20            EXAM      Vital Signs Reviewed      Gen: WDWN, Alert, NAD.        HEENT:  PERRL, EOMI.  OP, nares clear, no sinus tenderness.      Neck:  Supple, no JVD, no thyromegaly.      Lymph: No axillary, cervical, supraclavicular lymphadenopathy noted bilaterally.      Chest:  Good aeration, clear to auscultation bilaterally, tympanic to percussion    bilaterally, no work of breathing noted.      CV:   Tachycardia, no MGR, pulses 2+, equal.      Abd:  Soft, NT, ND, + BS, no HSM.      EXT:  No clubbing, no cyanosis, no edema, no joint tenderness.       Neuro:  A  Skin: No rashes or lesions.      Vtials      Vitals:             Height 5 ft  / 152.4 cm           Weight 143 lbs 2.000 oz / 64.945928 kg           BSA 1.62 m2           BMI 28.0 kg/m2           Temperature 97.1 F / 36.17 C - Temporal           Pulse 115           Respirations 20           Blood Pressure 139/72 Sitting, Left Arm           Pulse Oximetry 87%, room air           Comment: Rechecked Oxygen on 2 liters  93%            pulse 110            B/P  113/65            REVIEW      Results Reviewed      PCCS Results Reviewed?:  Yes Prev Lab Results, Yes Prev Radiology Results, Yes     [facility]            Assessment      Lung cancer - C34.90            Small cell lung cancer - C34.90            Notes      New Medications      * Tiotropium Br/Olodaterol HCl (Stiolto Respimat Inhal [place]) 4 GM MIST.INHAL: 2      PUFFS INH QDAY #1      *  Fluticasone (Flovent HFA) 220 MCG INHALER: 2 PUFF INH RTBID #1      * predniSONE 20 MG TABLET: 20 MG PO QDAY #10      New Diagnostics      * Pet Scan Init Whole Body, SCHEDULED PROCEDURE         Dx: Lung cancer - C34.90      * Brain W/WO Cont MRI, SCHEDULED PROCEDURE         Dx: Small cell lung cancer - C34.90      New Referrals      * Surgery, SCHEDULED PROCEDURE         Rick Sheets with [facility]         Dx: Small cell lung cancer - C34.90      ASSESSMENT:      1. Small cell lung cancer.       2. Chronic obstructive pulmonary disease.       3. Dyspnea.             PLAN:      1. Oncology to see the patient today.       2. We will obtain PET scan to determine if the patient is extensive versus     limited.       3. We will obtain MRI of the brain with and without contrast to rule out     intracranial metastasis.       4. We will send the patient to be evaluated by Dr. Sheets for port placement.       5. In regards to the patient's chronic obstructive pulmonary disease, we will     start the patient on Stiolto today along with flovent. We will continue DuoNeb     every 4 hours.       6. We will give the patient an extended burst of prednisone 20 mg for another 7     days.       7. We will start the patient on continuous oxygen at 2 liters flow.       8. We will see the patient back in 6 weeks. I am concerned at this time that her    treatment may be limited or delayed due to her severe chronic obstructive     pulmonary disease exacerbation action       9. I have personally reviewed laboratory data, imaging and previous medical     records.            Patient Education      Education resources provided:  Yes      Patient Education Provided:  Lung Cancer            Electronically signed by Tank Maya  01/08/2020 08:55       Disclaimer: Converted document may not contain table formatting or lab diagrams. Please see [employer] for the authenticated document.

## 2021-05-28 NOTE — PROGRESS NOTES
Patient: ANGEL DE JESUS     [id number]     Report: #[id number]  UNIT #: [id number]     : 1945    Encounter Date:2021  PRIMARY CARE: Heydi Julian  ***Signed***  --------------------------------------------------------------------------------------------------------------------  NURSE INTAKE      Visit Type      Established Patient Visit            Chief Complaint      LUNG CA            History and Present Illness      Past Oncology Illness History      Ms. De Jesus is a 76 yo F with a new diagnosis of small cell lung cancer.  She was    a heavy smoker until last week when she came into the hospital with shortness of    breath. Initial chest x-ray was concerning for a mass.  She then had a CT chest     on 20 which showed a mass at least 5.1cm involving the right hilum, right     paratracheal and       subcarinal regions causing encasement and narrowing of the right main bronchus     and right middle lobe bronchus and occlusion of the right upper lobe bronchus     with subsequent complete atelectasis of the right upper and right middle lobes.     Of note, there is a large hiatal hernia with gastric volvulus but no evidence of    gastric outlet obstruction.  Biopsy on 1/3/20 was positive for small cell lung     cancer.              Brain MRI 1/10/20:         1. There is an apparent focus of small possible abnormal diffusion restriction     within the subcortical or deep white matter of the right frontal lobe.      Increased associated T2 and FLAIR signal are seen.  The findings may indicate a     small focus of subacute ischemic change.  Findings secondary to T2 shine through    could also have this appearance.  2. Evidence for small multifocal intracranial     metastatic foci along the inferior margin of the right frontal lobe as well as     the peripheral medial posterior margin of the left cerebellum.  3. Nonspecific     white matter signal changes are observed likely due to chronic  small vessel     ischemic changes and age related changes.  Associated diffuse volume loss is     noted.      MOHAMUD GARRETT MD       Electronically Signed and Approved By: MOHAMUD GARRETT MD    on 1/10/2020 at 8:18               PET/CT 1/13/20      CONCLUSION:         1. No significant change in size of a right perihilar/mediastinal mass with     hypermetabolic activity consistent with malignancy.  The mass obstructs the     right upper and right middle lobe bronchi with associated atelectasis, which is     also unchanged.      2. No evidence of metastatic disease outside the chest.      3. Chronic changes as described above.            Cycle 1 of carboplatin plus etoposide on 1/22/2020.  Added Tecentriq with cycle     2 on 2/4/2020.  Completed 4 cycles of chemotherapy before continuing Tecentriq     immunotherapy alone.            Completed whole brain radiation and consolidative right lung radiation on     4/21/2020.            CT of the chest, abdomen, and pelvis on 6/25/2020: Significant improvement in     the right paratracheal and right hilar mass.  For example the right paratracheal    mass is 1.7 cm in greatest dimension and was previously 3.6 x 5.4 cm.  The right    upper lobe and right middle lobe are now normally aerated.  There are no new     pulmonary nodules or masses.  There is no metastatic disease in the abdomen     pelvis.  Patient does have incidental findings of emphysema and coronary     atherosclerosis.            On Tecentriq maintenance every 3 weeks until progression or intolerance.             PET/CT on 12/7/2020: Overall the scan shows further improvement with resolution     of the previous right hilar and mediastinal masses.  There are a few areas of     focus around the L4 vertebral body and left femoral head which we will continue     2 monitor.  The patient is currently asymptomatic.            HPI - Oncology Interim      Patient comes in today for her next cycle of Tecentriq.  She  feels well and has     no new complaints.  She says she misses her son and feels that she needs a visit    with him.  She would also like a break from the treatment.  We agreed to give     her 2 months off.  I will repeat her scan in early July which will be 4 months     from her previous.  After that we can discuss the results of the scan but hold     off on scheduling treatment until she feels she is ready.            Clinical Trial Participant      No            ECOG Performance Status      1            PAST, FAMILY   Past Medical History      Past Medical History:  COPD, High Cholesterol, Hypertension, Short of Air      Hematology/Oncology (F):  Lung Cancer            Past Surgical History      None            Family History      Family History:  No Family History            Social History      Number of Children:  3            Tobacco Use      Tobacco status:  Former smoker            Substance Use      Substance use:  Denies use            REVIEW OF SYSTEMS      General:  Denies: Appetite Change, Fatigue, Fever, Night Sweats, Weight Gain,     Weight Loss      Eye:  Denies Blurred Vision, Denies Corrective Lenses, Denies Diplopia, Denies     Vision Changes      ENT:  Denies Headache, Denies Hearing Loss, Denies Hoarseness, Denies Sore     Throat      Cardiovascular:  Denies Chest Pain, Denies Palpitations      Respiratory:  Denies: Cough, Coughing Blood, Productive Cough, Shortness of Air,    Wheezing      Gastrointestinal:  Denies Bloody Stools, Denies Constipation, Denies Diarrhea,     Denies Nausea/Vomiting, Denies Problem Swallowing, Denies Unable to Control     Bowels      Genitourinary:  Denies Blood in Urine, Denies Incontinence, Denies Painful     Urination      Musculoskeletal:  Denies Back Pain, Denies Muscle Pain, Denies Painful Joints      Integumentary:  Denies Itching, Denies Lesions, Denies Rash      Neurologic:  Denies Dizziness, Denies Numbness\Tingling, Denies Seizures      Psychiatric:   Denies Anxiety, Denies Depression      Endocrine:  Denies Cold Intolerance, Denies Heat Intolerance      Hematologic/Lymphatic:  Denies Bruising, Denies Bleeding, Denies Enlarged Lymph     Nodes      Reproductive:  Denies: Menopause, Heavy Periods, Pregnant, Still Menstruating            VITAL SIGNS AND SCORES      Vitals      Weight 118 lbs 9.720 oz / 53.8 kg      Temperature 97.8 F / 36.56 C - Temporal      Pulse 87      Respirations 20      Blood Pressure 152/78 Sitting      Pulse Oximetry 98%, RM AIR            Pain Score      Experiencing any pain?:  No      Pain Scale Used:  Numerical      Pain Intensity:  0            Fatigue Score      Experiencing any fatigue?:  No      Fatigue (0-10 scale):  0 (none)            EXAM      General: Alert, cooperative, no acute distress, well appearing      Eyes: Anicteric sclera, PERRLA      Respiratory: CTAB, normal respiratory effort      Abdomen: Normal active bowel sounds, no tenderness, no distention      Cardiovascular: RRR, no murmur, no lower extremity edema      Skin: Normal tone, no rash, no lesions      Psychiatric: Appropriate affect, intact judgment      Neurologic: No focal sensory or motor deficits, no weakness, numbness, dizziness      Musculoskeletal: Normal muscle strength and tone      Extremities: No clubbing, cyanosis, or deformities            PREVENTION      Hx Influenza Vaccination:  Yes      Date Influenza Vaccine Given:  Sep 18, 2020      Influenza Vaccine Declined:  No      2 or More Falls in Past Year?:  No      Fall Past Year with Injury?:  No      Hx Pneumococcal Vaccination:  Yes      Encouraged to follow-up with:  PCP regarding preventative exams.      Chart initiated by      JAMIE DENNIS CMA            ALLERGY/MEDS      Allergies      Coded Allergies:             NO KNOWN ALLERGIES (Unverified , 5/4/21)            Medications      Last Reconciled on 5/4/21 14:21 by MELCHOR ENCINAS      Prochlorperazine Maleate (Prochlorperazine Maleate) 10 Mg  Tab      10 MG PO Q6H PRN for NAUSEA AND/OR VOMITING, #60 TAB 3 Refills         Prov: MELCHOR ENCINAS         1/13/20       Ondansetron Odt (ONDANSETRON ODT) 8 Mg Tab.rapdis      8 MG PO Q8H PRN for NAUSEA, #30 TAB.RAPDIS 3 Refills         Prov: MELCHOR ENCINAS         1/13/20       HYDROcodone-Acetaminophen 5-325 Mg (HYDROcodone-Acetaminophen 5-325 Mg) 1 Each     Tablet      1-2 TAB PO Q4H for Post Surgical PAin, #8 TAB 0 Refills         Prov: SheetsRick         1/9/20       Fluticasone (Flovent HFA) 220 Mcg Inhaler      2 PUFF INH RTBID, #1 INH 6 Refills         Prov: Tank Maya         1/7/20       amLODIPine (amLODIPine) 5 Mg Tablet      5 MG PO QDAY         Reported         1/2/20       Pravastatin Sodium (Pravastatin Sodium) 10 Mg Tablet      10 MG PO QDAY, TAB         Reported         7/23/15       Losartan/Hydrochlorothiazide (Losartan/Hctz 100/25 Mg) 1 Tab Tablet      1 TAB PO QDAY, #30 TAB 0 Refills         Reported         7/23/15       MDI-Albuterol (Proventil HFA) 60 Puffs/Inh Puffs      2 PUFFS INH Q4-6HPRN         Reported         3/1/13       Aspirin EC (Aspirin EC) 81 Mg Tabec      81 MG PO QDAY         Reported         3/1/13      Medications Reviewed:  No Changes made to meds            IMPRESSION/PLAN      Diagnosis      SCLC (small cell lung carcinoma) - C34.90            Notes      New Diagnostics      * CT Abd/Pelvis/Chest W/Contrast, 2 Months         Dx: SCLC (small cell lung carcinoma) - C34.90            Plan      Extensive stage small cell lung cancer with CNS metastasis: Patient completed 4     cycles of carboplatin and etoposide with Tecentriq.  She is here today for her     next cycle of maintenance Tecentriq.  CNS metastasis was treated with whole     brain radiation therapy by Dr Cheung.  She has no complaints or concerns on her    labs are all within normal limits.  However, the patient would like a break from    treatment and would like to go visit her son.  She will return to  clinic in     early July with repeat CT CAP with contrast.  At that time we will discuss the     plan going forward which would include continued maintenance Tecentriq versus     observation only.            Patient Education      Patient Education Provided:  Yes            Electronically signed by MELCHOR ENCINAS  05/04/2021 14:21       Disclaimer: Converted document may not contain table formatting or lab diagrams. Please see [employer] for the authenticated document.

## 2021-05-28 NOTE — PROGRESS NOTES
Patient: ANGEL DE JESUS     Acct: [id number]     Report: #[id number]  UNIT #: [id number]     : 1945    Encounter Date:2020  PRIMARY CARE: Heydi Julian  ***Signed***  --------------------------------------------------------------------------------------------------------------------  NURSE INTAKE      Visit Type      New Patient Visit            Chief Complaint      LUNG MASS            Referring Provider/Copies To      Primary Care Provider:  HEYDI GARCIA      Copies To:   HEYDI GARCIA            History and Present Illness      Past Oncology Illness History      Ms. De Jesus is a 75 yo F with a new diagnosis of small cell lung cancer.  She was    a heavy smoker until last week when she came into the hospital with shortness of    breath. Initial chest x-ray was concerning for a mass.  She then had a CT chest     on 20 which showed a mass at least 5.1cm involving the right hilum, right     paratracheal and       subcarinal regions causing encasement and narrowing of the right main bronchus     and right middle lobe bronchus and occlusion of the right upper lobe bronchus     with subsequent complete atelectasis of the right upper and right middle lobes.     Of note, there is a large hiatal hernia with gastric volvulus but no evidence of    gastric outlet obstruction.  Biopsy on 1/3/20 was positive for small cell lung     cancer.            HPI - Oncology Interim      The patient reports she has stopped smoking.  She has a persistent cough with     some sputum production but denies fevers or worsening shortness of breath.  Her     cough is unchanged for weeks or longer. She does has significant shortness of     breath and was recently started on supplemental oxygen.              She lives alone and does all of her own ADLs. She has a few friends who can help    her through treatment.            Clinical Trial Participant      No            ECOG Performance Status      1            Most  Recent Lab Findings      Laboratory Tests      1/4/20 06:24            PAST, FAMILY   Past Medical History      Past Medical History:  COPD, High Cholesterol, Hypertension, Short of Air      Hematology/Oncology (F):  Lung Cancer            Past Surgical History      None            Family History      Family History:  No Family History            Social History      Marital Status:        Number of Children:  3            Tobacco Use      Tobacco status:  Former smoker      Smoking history:  25-50 pack years            Alcohol Use      Alcohol intake:  0-2 drinks per day            Substance Use      Substance use:  Denies use            REVIEW OF SYSTEMS      General:  Admits: Weight Loss;          Denies: Appetite Change, Fatigue, Fever, Night Sweats, Weight Gain      Eye:  Denies Blurred Vision, Denies Corrective Lenses, Denies Diplopia, Denies     Vision Changes      ENT:  Denies Headache, Denies Hearing Loss, Denies Hoarseness, Denies Sore     Throat      Cardiovascular:  Denies Chest Pain, Denies Palpitations      Respiratory:  Admits: Productive Cough, Shortness of Air;          Denies: Coughing Blood, Wheezing      Gastrointestinal:  Denies Bloody Stools, Denies Constipation, Denies Diarrhea,     Denies Nausea/Vomiting, Denies Problem Swallowing, Denies Unable to Control     Bowels      Genitourinary:  Denies Blood in Urine, Denies Incontinence, Denies Painful     Urination      Musculoskeletal:  Denies Back Pain, Denies Muscle Pain, Denies Painful Joints      Integumentary:  Denies Itching, Denies Lesions, Denies Rash      Neurologic:  Denies Dizziness, Denies Numbness\Tingling, Denies Seizures      Psychiatric:  Denies Anxiety, Denies Depression      Endocrine:  Denies Cold Intolerance, Denies Heat Intolerance      Hematologic/Lymphatic:  Denies Bruising, Denies Bleeding, Denies Enlarged Lymph     Nodes      Reproductive:  Denies: Menopause, Heavy Periods, Pregnant, Still Menstruating            VITAL  SIGNS AND SCORES      Vitals      Height 60 in / 152.4 cm      Weight 143 lbs 0.2 oz / 64.098931 kg      BSA 1.62 m2      BMI 27.9 kg/m2      Temperature 97.1 F / 36.17 C - Temporal      Pulse 110      Respirations 20      Blood Pressure 113/65 Sitting, Left Arm      Pulse Oximetry 93%, NC, 2 lpm            Pain Score      Experiencing any pain?:  No      Pain Intensity:  0            Fatigue Score      Experiencing any fatigue?:  No      Fatigue (0-10 scale):  0 (none)            EXAM      General Appearance:  Positive for: Alert, Cooperative      Eye:  Positive for: Anicteric Sclerae      HEENT:  Positive for: Oropharynx clear;          Negative for: Thrush      Other      nasal canula in place      Neck:  Negative for: Masses      Respiratory:  Positive for: Crackles, Diminished Breath, Rhochi      Abdomen/Gastro:  Positive for: Normal Active Bowel Sounds, Soft;          Negative for: Tenderness      Cardiovascular:  Positive for: RRR;          Negative for: Murmur, Peripheral Edema      Skin:  Negative for: Lesions, Rash      Psychiatric:  Positive for: Appropriate Affect, Intact Judgement      Neurologic:  Positive for: Cranial Ner II-XII Intact;          Negative for: Dizziness, Focal Sensory Deficit, Headache, Numbness, Weakness      Musculoskeletal:  Positive for: Full Muscle Strength, Full Muscle Tone      Lower Extremities:  Positive for: Normal Gait and Station;          Negative for: Clubbing, Edema      Upper Extremities:  Negative for: Clubbing, Digital Cyanosis            PREVENTION      Hx Influenza Vaccination:  No      Influenza Vaccine Declined:  No      2 or More Falls Past Year?:  No      Fall Past Year with Injury?:  No      Hx Pneumococcal Vaccination:  Yes      Encouraged to follow-up with:  PCP regarding preventative exams.      Chart initiated by      CHER AMADOR CMA            ALLERGY/MEDS      Allergies      Coded Allergies:             NO KNOWN ALLERGIES (Unverified , 1/7/20)             Medications      Last Reconciled on 1/8/20 21:47 by MELCHOR ENCINAS      predniSONE (predniSONE) 20 Mg Tablet      40 MG PO QDAY, TAB 0 Refills         Reported         1/8/20       Fluticasone (Flovent HFA) 220 Mcg Inhaler      2 PUFF INH RTBID, #1 INH 6 Refills         Prov: Tank Maya         1/7/20       Amoxicillin/Clavulanic Acid 875/125 (Augmentin 875/125) 1 Each Tablet      875 MG PO BID for 7 Days, #14 TAB 0 Refills         Prov: Abdirizak Lezama         1/4/20       amLODIPine (amLODIPine) 5 Mg Tablet      5 MG PO QDAY         Reported         1/2/20       Pravastatin Sodium (Pravastatin Sodium) 10 Mg Tablet      10 MG PO QDAY, TAB         Reported         7/23/15       Losartan/Hydrochlorothiazide (Losartan/Hctz 100/25 Mg) 1 Tab Tablet      1 TAB PO QDAY, #30 TAB 0 Refills         Reported         7/23/15       MDI-Albuterol (Proventil HFA) 60 Puffs/Inh Puffs      2 PUFFS INH Q4-6HPRN         Reported         3/1/13       Omega-3 Fatty Acids/Fish Oil (Fish Oil 1000 Mg) 1,000 Mg Capsule      1000 MG PO QDAY, CAP         Reported         3/1/13       Aspirin EC (Aspirin EC) 81 Mg Tabec      81 MG PO QDAY         Reported         3/1/13      Medications Reviewed:  No Changes made to meds            IMPRESSION/PLAN      Impression      75 yo F with new diagnosis of small cell lung cancer            Plan      Small Cell Lung Cancer: Staging work-up is not yet complete.  It is possible the    patient has limited stage disease.  We discussed the basic treatment plan for li    mited vs extensive stage disease.  Pulmonary has ordered an MRI of the brain and    PET scan.  She has an appointment in a few days for port placement.  I will hold    off on radiation oncology consult until staging is complete.  Patient will RTC     1-2 days after the PET scan to discuss the treatment plan and get consent.            Patient Education      Patient Education Provided:  Yes            Electronically signed by  MELCHOR ENCINAS  01/08/2020 21:48       Disclaimer: Converted document may not contain table formatting or lab diagrams. Please see [employer] for the authenticated document.

## 2021-06-28 ENCOUNTER — HOSPITAL ENCOUNTER (OUTPATIENT)
Dept: CT IMAGING | Facility: HOSPITAL | Age: 76
Discharge: HOME | End: 2021-06-28

## 2021-06-28 DIAGNOSIS — C34.00: ICD-10-CM

## 2021-06-28 DIAGNOSIS — C34.00 MALIGNANT NEOPLASM OF MAIN BRONCHUS, UNSPECIFIED LATERALITY (HCC): ICD-10-CM

## 2021-06-28 LAB — CREAT BLDA-MCNC: 1 MG/DL

## 2021-06-28 PROCEDURE — 71260 CT THORAX DX C+: CPT

## 2021-06-28 PROCEDURE — 74177 CT ABD & PELVIS W/CONTRAST: CPT

## 2021-06-28 PROCEDURE — 0 IOPAMIDOL PER 1 ML: Performed by: INTERNAL MEDICINE

## 2021-06-28 PROCEDURE — 82565 ASSAY OF CREATININE: CPT

## 2021-06-28 RX ORDER — HEPARIN SODIUM (PORCINE) LOCK FLUSH IV SOLN 100 UNIT/ML 100 UNIT/ML
SOLUTION INTRAVENOUS
Status: DISPENSED
Start: 2021-06-28 | End: 2021-06-28

## 2021-06-28 RX ADMIN — IOPAMIDOL 100 ML: 755 INJECTION, SOLUTION INTRAVENOUS at 09:12

## 2021-06-30 PROBLEM — C34.90 SMALL CELL LUNG CANCER: Status: ACTIVE | Noted: 2020-01-03

## 2021-07-01 PROBLEM — J45.909 ASTHMA: Status: ACTIVE | Noted: 2021-07-01

## 2021-07-01 PROBLEM — J98.4 LUNG DISEASE: Status: ACTIVE | Noted: 2021-07-01

## 2021-07-01 PROBLEM — I10 HIGH BLOOD PRESSURE: Status: ACTIVE | Noted: 2021-07-01

## 2021-07-01 PROBLEM — C34.90 LUNG CANCER: Status: ACTIVE | Noted: 2021-07-01

## 2021-07-01 PROBLEM — J44.9 CHRONIC OBSTRUCTIVE PULMONARY DISEASE (HCC): Status: ACTIVE | Noted: 2021-07-01

## 2021-07-01 PROBLEM — E78.00 HIGH CHOLESTEROL: Status: ACTIVE | Noted: 2021-07-01

## 2021-07-01 RX ORDER — PRAVASTATIN SODIUM 10 MG
TABLET ORAL
COMMUNITY
End: 2021-08-12

## 2021-07-01 RX ORDER — AMLODIPINE BESYLATE 5 MG/1
TABLET ORAL
COMMUNITY

## 2021-07-01 RX ORDER — ASPIRIN 81 MG/1
TABLET ORAL
COMMUNITY

## 2021-07-01 RX ORDER — LOSARTAN POTASSIUM 100 MG/1
TABLET ORAL
COMMUNITY

## 2021-07-01 RX ORDER — HYDROCHLOROTHIAZIDE 25 MG/1
TABLET ORAL
COMMUNITY

## 2021-07-06 ENCOUNTER — LAB (OUTPATIENT)
Dept: ONCOLOGY | Facility: HOSPITAL | Age: 76
End: 2021-07-06

## 2021-07-06 ENCOUNTER — OFFICE VISIT (OUTPATIENT)
Dept: ONCOLOGY | Facility: HOSPITAL | Age: 76
End: 2021-07-06

## 2021-07-06 VITALS
HEART RATE: 68 BPM | TEMPERATURE: 96.4 F | DIASTOLIC BLOOD PRESSURE: 59 MMHG | BODY MASS INDEX: 23.78 KG/M2 | OXYGEN SATURATION: 100 % | WEIGHT: 119.71 LBS | RESPIRATION RATE: 18 BRPM | SYSTOLIC BLOOD PRESSURE: 145 MMHG

## 2021-07-06 DIAGNOSIS — C34.90 MALIGNANT NEOPLASM OF LUNG, UNSPECIFIED LATERALITY, UNSPECIFIED PART OF LUNG (HCC): ICD-10-CM

## 2021-07-06 LAB
ALBUMIN SERPL-MCNC: 3.96 G/DL (ref 3.5–5.2)
ALBUMIN/GLOB SERPL: 1.4 G/DL
ALP SERPL-CCNC: 106 U/L (ref 39–117)
ALT SERPL W P-5'-P-CCNC: 9 U/L (ref 1–33)
ANION GAP SERPL CALCULATED.3IONS-SCNC: 9.6 MMOL/L (ref 5–15)
AST SERPL-CCNC: 15 U/L (ref 1–32)
BASOPHILS # BLD AUTO: 0.04 10*3/MM3 (ref 0–0.2)
BASOPHILS NFR BLD AUTO: 0.6 % (ref 0–1.5)
BILIRUB SERPL-MCNC: 0.3 MG/DL (ref 0–1.2)
BUN SERPL-MCNC: 13 MG/DL (ref 8–23)
BUN/CREAT SERPL: 12.1 (ref 7–25)
CALCIUM SPEC-SCNC: 10.6 MG/DL (ref 8.6–10.5)
CHLORIDE SERPL-SCNC: 101 MMOL/L (ref 98–107)
CO2 SERPL-SCNC: 29.4 MMOL/L (ref 22–29)
CREAT SERPL-MCNC: 1.07 MG/DL (ref 0.57–1)
DEPRECATED RDW RBC AUTO: 46.9 FL (ref 37–54)
EOSINOPHIL # BLD AUTO: 0.15 10*3/MM3 (ref 0–0.4)
EOSINOPHIL NFR BLD AUTO: 2.2 % (ref 0.3–6.2)
ERYTHROCYTE [DISTWIDTH] IN BLOOD BY AUTOMATED COUNT: 14.1 % (ref 12.3–15.4)
GFR SERPL CREATININE-BSD FRML MDRD: 50 ML/MIN/1.73
GLOBULIN UR ELPH-MCNC: 2.8 GM/DL
GLUCOSE SERPL-MCNC: 91 MG/DL (ref 65–99)
HCT VFR BLD AUTO: 42.8 % (ref 34–46.6)
HGB BLD-MCNC: 14.1 G/DL (ref 12–15.9)
IMM GRANULOCYTES # BLD AUTO: 0.02 10*3/MM3 (ref 0–0.05)
IMM GRANULOCYTES NFR BLD AUTO: 0.3 % (ref 0–0.5)
LYMPHOCYTES # BLD AUTO: 1.87 10*3/MM3 (ref 0.7–3.1)
LYMPHOCYTES NFR BLD AUTO: 27 % (ref 19.6–45.3)
MCH RBC QN AUTO: 29.6 PG (ref 26.6–33)
MCHC RBC AUTO-ENTMCNC: 32.9 G/DL (ref 31.5–35.7)
MCV RBC AUTO: 89.7 FL (ref 79–97)
MONOCYTES # BLD AUTO: 0.64 10*3/MM3 (ref 0.1–0.9)
MONOCYTES NFR BLD AUTO: 9.2 % (ref 5–12)
NEUTROPHILS NFR BLD AUTO: 4.21 10*3/MM3 (ref 1.7–7)
NEUTROPHILS NFR BLD AUTO: 60.7 % (ref 42.7–76)
PLATELET # BLD AUTO: 276 10*3/MM3 (ref 140–450)
PMV BLD AUTO: 8.7 FL (ref 6–12)
POTASSIUM SERPL-SCNC: 4.1 MMOL/L (ref 3.5–5.2)
PROT SERPL-MCNC: 6.8 G/DL (ref 6–8.5)
RBC # BLD AUTO: 4.77 10*6/MM3 (ref 3.77–5.28)
SODIUM SERPL-SCNC: 140 MMOL/L (ref 136–145)
WBC # BLD AUTO: 6.93 10*3/MM3 (ref 3.4–10.8)

## 2021-07-06 PROCEDURE — G0463 HOSPITAL OUTPT CLINIC VISIT: HCPCS

## 2021-07-06 PROCEDURE — 99213 OFFICE O/P EST LOW 20 MIN: CPT | Performed by: INTERNAL MEDICINE

## 2021-07-06 PROCEDURE — 80053 COMPREHEN METABOLIC PANEL: CPT

## 2021-07-06 PROCEDURE — 36415 COLL VENOUS BLD VENIPUNCTURE: CPT

## 2021-07-06 PROCEDURE — 85025 COMPLETE CBC W/AUTO DIFF WBC: CPT

## 2021-07-06 NOTE — PROGRESS NOTES
Jane De Jesus   : 1945     LOCATION: [facility] HEMATOLOGY & ONCOLOGY     Chief Complaint  Lung Cancer and Follow-up    Referring Provider: No Known Provider  PCP: Provider, No Known    Oncology/Hematology History Overview Note   Ms. De Jesus is a 76 yo F with a new diagnosis of small cell lung cancer.  She was a heavy smoker until last week when she came into the hospital with shortness of breath. Initial chest x-ray was concerning for a mass.  She then had a CT chest on 20 which showed a mass at least 5.1cm involving the right hilum, right paratracheal and   subcarinal regions causing encasement and narrowing of the right main bronchus and right middle lobe bronchus and occlusion of the right upper lobe bronchus with subsequent complete atelectasis of the right upper and right middle lobes.  Of note, there is a large hiatal hernia with gastric volvulus but no evidence of gastric outlet obstruction.  Biopsy on 1/3/20 was positive for small cell lung cancer.      Brain MRI 1/10/20:   1. There is an apparent focus of small possible abnormal diffusion restriction within the subcortical or deep white matter of the right frontal lobe.  Increased associated T2 and FLAIR signal are seen.  The findings may indicate a small focus of subacute ischemic change.  Findings secondary to T2 shine through could also have this appearance.  2. Evidence for small multifocal intracranial metastatic foci along the inferior margin of the right frontal lobe as well as the peripheral medial posterior margin of the left cerebellum.  3. Nonspecific white matter signal changes are observed likely due to chronic small vessel ischemic changes and age related changes.  Associated diffuse volume loss is noted.  MOHAMUD GARRETT MD       Electronically Signed and Approved By: MOHAMUD GARRETT MD on 1/10/2020 at 8:18       PET/CT 20  CONCLUSION:   1. No significant change in size of a right perihilar/mediastinal  mass with hypermetabolic activity consistent with malignancy.  The mass obstructs the right upper and right middle lobe bronchi with associated atelectasis, which is also unchanged.  2. No evidence of metastatic disease outside the chest.  3. Chronic changes as described above.    Cycle 1 of carboplatin plus etoposide on 1/22/2020.  Added Tecentriq with cycle 2 on 2/4/2020.  Completed 4 cycles of chemotherapy before continuing Tecentriq immunotherapy alone.    Completed whole brain radiation and consolidative right lung radiation on 4/21/2020.    CT of the chest, abdomen, and pelvis on 6/25/2020: Significant improvement in the right paratracheal and right hilar mass.  For example the right paratracheal mass is 1.7 cm in greatest dimension and was previously 3.6 x 5.4 cm.  The right upper lobe and right middle lobe are now normally aerated.  There are no new pulmonary nodules or masses.  There is no metastatic disease in the abdomen pelvis.  Patient does have incidental findings of emphysema and coronary atherosclerosis.    On Tecentriq maintenance every 3 weeks until progression or intolerance.     PET/CT on 12/7/2020: Overall the scan shows further improvement with resolution of the previous right hilar and mediastinal masses.  There are a few areas of focus around the L4 vertebral body and left femoral head which we will continue 2 monitor.  The patient is currently asymptomatic.       Small cell lung cancer (CMS/HCC)   1/3/2020 Initial Diagnosis    Small cell lung cancer (CMS/HCC)           Subjective        History of Present Illness   76-year-old female with a history of small cell lung cancer with brain metastasis  Patient was on Tecentriq however she discontinued this approximately 2 months ago because she needed a break  She returns today after follow-up CT scans for follow-up evaluation and management  She denies any shortness of breath or dyspnea on exertion  Denies any headaches confusion or or any  weakness  Review of Systems   Constitutional: Negative for appetite change, diaphoresis, fatigue, fever, unexpected weight gain and unexpected weight loss.   HENT: Negative for hearing loss, mouth sores, sore throat, swollen glands, trouble swallowing and voice change.    Eyes: Negative for blurred vision.   Respiratory: Negative for cough, shortness of breath and wheezing.    Cardiovascular: Negative for chest pain and palpitations.   Gastrointestinal: Negative for abdominal pain, blood in stool, constipation, diarrhea, nausea and vomiting.   Endocrine: Negative for cold intolerance and heat intolerance.   Genitourinary: Negative for difficulty urinating, dysuria, frequency, hematuria and urinary incontinence.   Musculoskeletal: Negative for arthralgias, back pain and myalgias.   Skin: Negative for rash, skin lesions and bruise.   Neurological: Negative for dizziness, seizures, weakness, numbness and headache.   Hematological: Does not bruise/bleed easily.   Psychiatric/Behavioral: Negative for depressed mood. The patient is not nervous/anxious.      Current Outpatient Medications on File Prior to Visit   Medication Sig Dispense Refill   • albuterol (PROVENTIL) (5 MG/ML) 0.5% nebulizer solution      • aspirin (aspirin) 81 MG EC tablet      • amLODIPine (NORVASC) 5 MG tablet      • Fluticasone Propionate HFA (FLOVENT HFA IN)      • hydroCHLOROthiazide (HYDRODIURIL) 25 MG tablet      • losartan (COZAAR) 100 MG tablet losartan 100 mg oral tablet take 1 tablet (100 mg) by oral route once daily   Active     • pravastatin (PRAVACHOL) 10 MG tablet        No current facility-administered medications on file prior to visit.       No Known Allergies    Past Medical History:   Diagnosis Date   • Brain cancer (CMS/HCC)    • Lung cancer (CMS/HCC)      No past surgical history on file.  Social History     Socioeconomic History   • Marital status:      Spouse name: Not on file   • Number of children: Not on file   • Years  of education: Not on file   • Highest education level: Not on file   Tobacco Use   • Smoking status: Current Some Day Smoker   • Smokeless tobacco: Never Used   • Tobacco comment: OCC WHEN NERVOUS PT STATES   Vaping Use   • Vaping Use: Never used   Substance and Sexual Activity   • Alcohol use: Never   • Drug use: Defer   • Sexual activity: Defer     No family history on file.  Immunization History   Administered Date(s) Administered   • COVID-19 (MODERNA) 03/24/2021, 04/21/2021   • Fluzone High Dose =>65 Years ([employer] ONLY) 09/28/2018, 09/18/2020   • Hepatitis A 04/26/2018, 10/26/2018   • Pneumococcal Conjugate 13-Valent (PCV13) 04/26/2018   • Pneumococcal Polysaccharide (PPSV23) 04/30/2019       Objective     Vitals:    07/06/21 0824   BP: 145/59  Comment: LT ARM   Pulse: 68   Resp: 18   Temp: 96.4 °F (35.8 °C)   TempSrc: Temporal   SpO2: 100%  Comment: RM AIR   Weight: 54.3 kg (119 lb 11.4 oz)   PainSc: 0-No pain     Body mass index is 23.78 kg/m².     Physical Exam  Constitutional:       Appearance: Normal appearance.   HENT:      Head: Normocephalic and atraumatic.   Eyes:      Pupils: Pupils are equal, round, and reactive to light.   Cardiovascular:      Rate and Rhythm: Regular rhythm.      Pulses: Normal pulses.      Heart sounds: Normal heart sounds.   Pulmonary:      Effort: Pulmonary effort is normal.      Breath sounds: Normal breath sounds.   Abdominal:      Palpations: Abdomen is soft.   Musculoskeletal:         General: Normal range of motion.      Cervical back: Normal range of motion.   Neurological:      General: No focal deficit present.      Mental Status: She is alert.   Psychiatric:         Mood and Affect: Mood normal.               Iron   Date Value Ref Range Status   02/25/2020 26 (L) 60 - 170 ug/dL Final     Iron Saturation   Date Value Ref Range Status   02/25/2020 15 (L) 20 - 55 % Final     Transferrin   Date Value Ref Range Status   02/25/2020 121.00 (L) 250.00 - 380.00 mg/dL Final      TIBC   Date Value Ref Range Status   02/25/2020 173 (L) 245 - 450 ug/dL Final     Comment:     As of 10/15/03, the chemistry department began utilizing a Transferrin  assay. Data suggests that Transferrin is a better estimate of Total Iron  binding capacity than the TIBC assay. As a result the TIBC will now be a  calculated estimate from the measured Transferrin.       Ferritin   Date Value Ref Range Status   02/25/2020 301 (H) 10 - 200 ng/mL Final     Comment:     <10 ng/ml usually associated with Iron Deficiency Anemia.  Above normal range levels may be due to Hepatic and/or  Chronic Inflammatory Disease.       ECOG score: 1           PHQ-9 Total Score: 0         Result Review :   The following data was reviewed by: Mirtha Barboza MD on 07/06/2021:  Lab Results   Component Value Date    HGB 14.1 07/06/2021    HCT 42.8 07/06/2021    MCV 89.7 07/06/2021     07/06/2021    WBC 6.93 07/06/2021    NEUTROABS 4.21 07/06/2021    LYMPHSABS 1.87 07/06/2021    MONOSABS 0.64 07/06/2021    EOSABS 0.15 07/06/2021    BASOSABS 0.04 07/06/2021     Lab Results   Component Value Date    GLUCOSE 91 07/06/2021    BUN 13 07/06/2021    CREATININE 1.07 (H) 07/06/2021     07/06/2021    K 4.1 07/06/2021     07/06/2021    CO2 29.4 (H) 07/06/2021    CALCIUM 10.6 (H) 07/06/2021    PROTEINTOT 6.8 07/06/2021    ALBUMIN 3.96 07/06/2021    BILITOT 0.3 07/06/2021    ALKPHOS 106 07/06/2021    AST 15 07/06/2021    ALT 9 07/06/2021       Ct chest 6/28/21  CONCLUSION: Similar-appearing right lower paratracheal and right suprahilar soft tissue thickening   and chronic changes in both lungs, without evidence of disease progression or new   malignant/metastatic disease in the chest.                            Data reviewed: Radiologic studies ct scans          Assessment and Plan      ASSESSMENT  Advanced stage small cell lung cancer  PLAN/RECOMMENDATIONS  I reviewed the results of the CT scans with the patient  Her CT scan remains  stable with no evidence for disease progression  We discussed restart of Tecentriq and the patient declines continuing on Tecentriq  I discussed that Tecentriq is to maintain the good response she received with chemotherapy as she already has advanced disease  The patient continues to decline additional treatment  We will consider follow-up brain scan to assess for stability  Labs CMP and CBC ordered today  On follow-up 3 months    Diagnoses and all orders for this visit:    1. Malignant neoplasm of lung, unspecified laterality, unspecified part of lung (CMS/HCC)  -     CBC & Differential; Future  -     Comprehensive Metabolic Panel; Future        Patient was given instructions and counseling regarding her condition or for health maintenance advice. Please see specific information pulled into the AVS if appropriate.     Mirtha Barboza MD    7/27/2021

## 2021-08-13 ENCOUNTER — TELEPHONE (OUTPATIENT)
Dept: ORTHOPEDIC SURGERY | Facility: CLINIC | Age: 76
End: 2021-08-13

## 2021-08-13 NOTE — TELEPHONE ENCOUNTER
CARE FIRST [address]ING [address]. XRAY/ LEFT WRIST BROKE/ NO SX-WC-AUTO/ COVID NEG/ [employer] AND [facility] /

## 2021-08-17 ENCOUNTER — HOSPITAL ENCOUNTER (EMERGENCY)
Facility: HOSPITAL | Age: 76
Discharge: HOME | End: 2021-08-17
Attending: EMERGENCY MEDICINE | Admitting: EMERGENCY MEDICINE

## 2021-08-17 VITALS
HEART RATE: 87 BPM | WEIGHT: 120 LBS | OXYGEN SATURATION: 98 % | SYSTOLIC BLOOD PRESSURE: 136 MMHG | RESPIRATION RATE: 20 BRPM | TEMPERATURE: 98.2 F | BODY MASS INDEX: 23.56 KG/M2 | DIASTOLIC BLOOD PRESSURE: 71 MMHG | HEIGHT: 60 IN

## 2021-08-17 DIAGNOSIS — Z47.89 AFTERCARE FOR CAST OR SPLINT CHECK OR CHANGE: Primary | ICD-10-CM

## 2021-08-17 PROCEDURE — 99282 EMERGENCY DEPT VISIT SF MDM: CPT

## 2021-08-17 NOTE — ED PROVIDER NOTES
"Subjective   Pt presents with complaints of splint on left arm being too tight causing her discomfort in the mid to proximal forearm, \"pinching the skin\". She has splint due to fall where she was diagnosed with radius and ulna fractures, this occurred on Friday 8/13. She has follow up scheduled next week with [facility] ortho.       History provided by:  Patient and medical records      Review of Systems   Constitutional: Negative for chills and fever.   HENT: Negative for congestion, ear pain and sore throat.    Eyes: Negative for pain.   Respiratory: Negative for cough, chest tightness and shortness of breath.    Cardiovascular: Negative for chest pain.   Gastrointestinal: Negative for abdominal pain, diarrhea, nausea and vomiting.   Genitourinary: Negative for flank pain and hematuria.   Musculoskeletal: Positive for joint swelling (left wrist).   Skin: Negative for pallor.   Neurological: Negative for seizures and headaches.   All other systems reviewed and are negative.      Past Medical History:   Diagnosis Date   • Asthma    • Brain cancer (CMS/HCC)    • Chronic allergic rhinitis    • COPD (chronic obstructive pulmonary disease) (CMS/HCC)    • High blood pressure    • High cholesterol    • Lung cancer (CMS/HCC)    • Lung disease    • Shortness of breath        No Known Allergies    Past Surgical History:   Procedure Laterality Date   • PORTACATH PLACEMENT         Family History   Family history unknown: Yes       Social History     Socioeconomic History   • Marital status:      Spouse name: Not on file   • Number of children: Not on file   • Years of education: Not on file   • Highest education level: Not on file   Tobacco Use   • Smoking status: Current Some Day Smoker   • Smokeless tobacco: Never Used   • Tobacco comment: OCC WHEN NERVOUS PT STATES   Vaping Use   • Vaping Use: Never used   Substance and Sexual Activity   • Alcohol use: Never   • Drug use: Defer   • Sexual activity: Defer "           Objective   Physical Exam  Vitals and nursing note reviewed.   Constitutional:       General: She is not in acute distress.     Appearance: Normal appearance. She is not toxic-appearing.   HENT:      Head: Normocephalic and atraumatic.      Mouth/Throat:      Mouth: Mucous membranes are moist.   Eyes:      Extraocular Movements: Extraocular movements intact.      Pupils: Pupils are equal, round, and reactive to light.   Cardiovascular:      Rate and Rhythm: Normal rate and regular rhythm.      Pulses: Normal pulses.      Heart sounds: Normal heart sounds.   Pulmonary:      Effort: Pulmonary effort is normal. No respiratory distress.      Breath sounds: Normal breath sounds.   Abdominal:      General: Abdomen is flat.      Palpations: Abdomen is soft.      Tenderness: There is no abdominal tenderness.   Musculoskeletal:      Left forearm: No swelling, edema or lacerations.      Left wrist: Swelling, deformity and bony tenderness present. Decreased range of motion. Normal pulse.      Left hand: Normal sensation. There is no disruption of two-point discrimination. Normal capillary refill. Normal pulse.      Cervical back: Normal range of motion and neck supple.   Skin:     General: Skin is warm and dry.   Neurological:      Mental Status: She is alert and oriented to person, place, and time. Mental status is at baseline.         Procedures           ED Course  ED Course as of Aug 18 0018   Tue Aug 17, 2021   1830 Pt feels better after splint changed.     [CM]      ED Course User Index  [CM] Betito Huff, STEPHEN                                           [facility]  Number of Diagnoses or Management Options  Aftercare for cast or splint check or change: minor  Diagnosis management comments: I have spoken with the patient. I have explained the patient´s condition, diagnoses and treatment plan based on the information available to me at this time. I have answered the patient's questions and addressed any  concerns. The patient has a good  understanding of the patient´s diagnosis, condition, and treatment plan as can be expected at this point. The vital signs have been stable. The patient´s condition is stable and appropriate for discharge from the emergency department.      The patient will pursue further outpatient evaluation with the primary care physician or other designated or consulting physician as outlined in the discharge instructions. They are agreeable to this plan of care and follow-up instructions have been explained in detail. The patient has received these instructions in written format and have expressed an understanding of the discharge instructions. The patient is aware that any significant change in condition or worsening of symptoms should prompt an immediate return to this or the closest emergency department or call to 911.       Amount and/or Complexity of Data Reviewed  Review and summarize past medical records: yes (Reviewed urgent care visit and images from 8/13)    Risk of Complications, Morbidity, and/or Mortality  Presenting problems: minimal  Diagnostic procedures: minimal  Management options: minimal    Patient Progress  Patient progress: stable      Final diagnoses:   Aftercare for cast or splint check or change       ED Disposition  ED Disposition     ED Disposition Condition Comment    Discharge Stable           Provider, No Known  [facility]  [place] [id number]    In 3 days  As needed         Medication List      No changes were made to your prescriptions during this visit.          Betito Huff, APRN  08/18/21 0018

## 2021-08-17 NOTE — DISCHARGE INSTRUCTIONS
Follow up as scheduled with your orthopedic provider next week. Continue to apply ice to splint/left wrist for 15-20 minutes at a time, 4-6 times a day.

## 2021-08-23 ENCOUNTER — OFFICE VISIT (OUTPATIENT)
Dept: ORTHOPEDIC SURGERY | Facility: CLINIC | Age: 76
End: 2021-08-23

## 2021-08-23 VITALS — OXYGEN SATURATION: 97 % | BODY MASS INDEX: 23.56 KG/M2 | HEART RATE: 86 BPM | HEIGHT: 60 IN | WEIGHT: 120 LBS

## 2021-08-23 DIAGNOSIS — S52.502A CLOSED FRACTURE OF DISTAL END OF LEFT RADIUS, UNSPECIFIED FRACTURE MORPHOLOGY, INITIAL ENCOUNTER: Primary | ICD-10-CM

## 2021-08-23 DIAGNOSIS — M25.532 LEFT WRIST PAIN: ICD-10-CM

## 2021-08-23 PROCEDURE — 25600 CLTX DST RDL FX/EPHYS SEP WO: CPT | Performed by: ORTHOPAEDIC SURGERY

## 2021-08-23 PROCEDURE — 99214 OFFICE O/P EST MOD 30 MIN: CPT | Performed by: ORTHOPAEDIC SURGERY

## 2021-08-23 NOTE — PROGRESS NOTES
"Chief Complaint  Pain of the Left Wrist     Subjective      Jane De Jesus presents to [facility] for an evaluation of left wrist. Patient had fallen 2 weeks ago on Thursday, 8/12/21. She had an appointment last Monday but it got cancelled on them. Patient is in a wheelchair due to instability and weakness with standing. Patient recently got done with chemo for single cell carcinoma.       No Known Allergies     Social History     Socioeconomic History   • Marital status:      Spouse name: Not on file   • Number of children: Not on file   • Years of education: Not on file   • Highest education level: Not on file   Tobacco Use   • Smoking status: Current Some Day Smoker   • Smokeless tobacco: Never Used   • Tobacco comment: OCC WHEN NERVOUS PT STATES   Vaping Use   • Vaping Use: Never used   Substance and Sexual Activity   • Alcohol use: Never   • Drug use: Defer   • Sexual activity: Defer        Review of Systems     Objective   Vital Signs:   Pulse 86   Ht 152.4 cm (60\")   Wt 54.4 kg (120 lb)   SpO2 97%   BMI 23.44 kg/m²       Physical Exam  Constitutional:       Appearance: Normal appearance. He is well-developed and normal weight.   HENT:      Head: Normocephalic.      Right Ear: Hearing and external ear normal.      Left Ear: Hearing and external ear normal.      Nose: Nose normal.   Eyes:      Conjunctiva/sclera: Conjunctivae normal.   Cardiovascular:      Rate and Rhythm: Normal rate.   Pulmonary:      Effort: Pulmonary effort is normal.      Breath sounds: No wheezing or rales.   Abdominal:      Palpations: Abdomen is soft.      Tenderness: There is no abdominal tenderness.   Musculoskeletal:      Cervical back: Normal range of motion.   Skin:     Findings: No rash.   Neurological:      Mental Status: He is alert and oriented to person, place, and time.   Psychiatric:         Mood and Affect: Mood and affect normal.         Judgment: Judgment normal.       Ortho Exam  "     LEFT WRIST: Sensation grossly intact. Neurovascular intact. Skin intact. Swelling. Slight deformity of the wrist. Moderate tenderness about the wrist. Radial pulse 2+, ulnar pulse 2+. Patient able to wiggle fingers and form a fist. Full flexion and extension of the elbow. Good tone of deltoid, triceps, wrist extensors and wrist flexors.       Orthopedic Injury Treatment    Date/Time: 8/23/2021 2:00 PM  Performed by: Cody Proctor MD  Authorized by: Cody Proctor MD   Injury location: wrist  Location details: left wrist  Injury type: fracture  Fracture type: distal radius  Pre-procedure neurovascular assessment: neurovascularly intact    Anesthesia:  Local anesthesia used: no    Sedation:  Patient sedated: no    Immobilization: cast (short arm)  Supplies used: Fiberglass.  Post-procedure neurovascular assessment: post-procedure neurovascularly intact  Patient tolerance: patient tolerated the procedure well with no immediate complications  Comments: Closed treatment was obtained and fiberglass cast was applied.  The patient tolerated the procedure without any complications.                Imaging Results (Most Recent)     None           Result Review :       XR Wrist 3+ View Left    Result Date: 8/12/2021  Narrative: PROCEDURE: XR WRIST 3+ VW LEFT  COMPARISON: None  INDICATIONS: fell onto concrete - pain distal radius  FINDINGS:  Osteopenia.  There is swelling distal forearm and wrist.  There is a fracture of the distal radius transversely oriented the the region of the metaphysis.  Minimal if any angulation is suspected.  There also is a fracture of the distal ulna may be comminuted with no significant angulation or displacement.  CONCLUSION:  1. Transverse fracture of the distal radius. 2. Transverse fracture of the distal ulna may be comminuted. 3. There is significant swelling.      ZACHARY SWANN MD       Electronically Signed and Approved By: ZACHARY SWANN MD on 8/12/2021 at 17:36                 X-Ray Report:  Left wrist(s) X-Ray  Indication: Evaluation of left wrist pain   AP and Lateral view(s)  Findings: Evidence for a fracture of the distal radius with some mild angulation.   Prior studies available for comparison: no       Assessment and Plan     DX: Left distal radius fracture     Discussed treatment plans and diagnosis with the patient. Discussed operative vs non-operative measures. If patient proceeds with casting, she will have a crooked wrist and a decrease in  strength. She understands this and still wishes to proceed with casting.     Call or return if worsening symptoms.    Follow Up     3 weeks.       Patient was given instructions and counseling regarding her condition or for health maintenance advice. Please see specific information pulled into the AVS if appropriate.     Scribed for Cody Proctor MD by Mitzi Bustamante.  08/23/21   13:18 EDT    I have personally performed the services described in this document as scribed by the above individual and it is both accurate and complete. Cody Proctor MD 08/24/21

## 2021-09-13 ENCOUNTER — OFFICE VISIT (OUTPATIENT)
Dept: ORTHOPEDIC SURGERY | Facility: CLINIC | Age: 76
End: 2021-09-13

## 2021-09-13 VITALS — OXYGEN SATURATION: 98 % | HEART RATE: 78 BPM | BODY MASS INDEX: 23.44 KG/M2 | HEIGHT: 60 IN

## 2021-09-13 DIAGNOSIS — M25.532 LEFT WRIST PAIN: ICD-10-CM

## 2021-09-13 DIAGNOSIS — S52.502A CLOSED FRACTURE OF DISTAL END OF LEFT RADIUS, UNSPECIFIED FRACTURE MORPHOLOGY, INITIAL ENCOUNTER: Primary | ICD-10-CM

## 2021-09-13 PROCEDURE — 99024 POSTOP FOLLOW-UP VISIT: CPT | Performed by: ORTHOPAEDIC SURGERY

## 2021-09-13 NOTE — PROGRESS NOTES
"Chief Complaint  Pain of the Left Wrist     Subjective      Jane De Jesus presents to [facility] for an evaluation of left wrist. Patient sustained a left distal radius fracture after falling on 8/12/21. Last visit we discussed operative measures for the left wrist but patient wished to proceed with conservative management. She was casted last visit. She understood that her wrist would heal crooked if she did not proceed with surgical intervention.     No Known Allergies     Social History     Socioeconomic History   • Marital status:      Spouse name: Not on file   • Number of children: Not on file   • Years of education: Not on file   • Highest education level: Not on file   Tobacco Use   • Smoking status: Current Some Day Smoker   • Smokeless tobacco: Never Used   • Tobacco comment: OCC WHEN NERVOUS PT STATES   Vaping Use   • Vaping Use: Never used   Substance and Sexual Activity   • Alcohol use: Never   • Drug use: Defer   • Sexual activity: Defer        Review of Systems     Objective   Vital Signs:   Pulse 78   Ht 152.4 cm (60\")   SpO2 98%   BMI 23.44 kg/m²       Physical Exam  Constitutional:       Appearance: Normal appearance. Patient is well-developed and normal weight.   HENT:      Head: Normocephalic.      Right Ear: Hearing and external ear normal.      Left Ear: Hearing and external ear normal.      Nose: Nose normal.   Eyes:      Conjunctiva/sclera: Conjunctivae normal.   Cardiovascular:      Rate and Rhythm: Normal rate.   Pulmonary:      Effort: Pulmonary effort is normal.      Breath sounds: No wheezing or rales.   Abdominal:      Palpations: Abdomen is soft.      Tenderness: There is no abdominal tenderness.   Musculoskeletal:      Cervical back: Normal range of motion.   Skin:     Findings: No rash.   Neurological:      Mental Status: Patient  is alert and oriented to person, place, and time.   Psychiatric:         Mood and Affect: Mood and affect normal. "         Judgment: Judgment normal.       Ortho Exam      LEFT WRIST: Ambulation in a wheelchair. The cast was removed in office today. Mild swelling. Patient able to wiggle fingers and form a fist. Radial pulse 2+, ulnar pulse 2+. Full elbow flexion and extension. Skin intact. Slight deformity of the wrist. Non-tender about the wrist.       Procedures        Imaging Results (Most Recent)     Procedure Component Value Units Date/Time    XR Wrist 2 View Left [[id number]] Resulted: 09/13/21 0809     Updated: 09/13/21 0809           Result Review :       X-Ray Report:  Left wrist(s) X-Ray  Indication: Evaluation of left wrist pain   AP and Lateral view(s)  Findings: Fracture of the left distal radius with early signs of healing.   Prior studies available for comparison: yes        Assessment and Plan     DX: Left distal radius fracture     The cast was removed in office today. Patient placed into a brace today. She may take the brace of and start moving her wrist. Repeat films next visit.       Call or return if worsening symptoms.    Follow Up     4-5 weeks.       Patient was given instructions and counseling regarding her condition or for health maintenance advice. Please see specific information pulled into the AVS if appropriate.     Scribed for Cody Proctor MD by Mitzi Bustamante.  09/13/21   08:16 EDT    I have personally performed the services described in this document as scribed by the above individual and it is both accurate and complete. Cody Proctor MD 09/15/21

## 2021-10-06 ENCOUNTER — TELEPHONE (OUTPATIENT)
Dept: ONCOLOGY | Facility: HOSPITAL | Age: 76
End: 2021-10-06

## 2021-10-06 PROBLEM — E16.2 HYPOGLYCEMIA: Status: ACTIVE | Noted: 2020-04-22

## 2021-10-06 NOTE — TELEPHONE ENCOUNTER
----- Message from Nila Winslow RN sent at 10/6/2021 10:23 AM EDT -----  This patient missed her appointment with Dr. Barboza today. Can you please make sure when her appointment is re-scheduled, that it's with Dr. Vee and not Dr. Barboza? Thanks.